# Patient Record
Sex: FEMALE | Race: WHITE | URBAN - METROPOLITAN AREA
[De-identification: names, ages, dates, MRNs, and addresses within clinical notes are randomized per-mention and may not be internally consistent; named-entity substitution may affect disease eponyms.]

---
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
## 2021-01-13 ENCOUNTER — OFFICE (OUTPATIENT)
Dept: URBAN - METROPOLITAN AREA CLINIC 64 | Facility: CLINIC | Age: 21
End: 2021-01-13
Payer: COMMERCIAL

## 2021-01-13 VITALS
SYSTOLIC BLOOD PRESSURE: 112 MMHG | HEIGHT: 60 IN | HEART RATE: 68 BPM | DIASTOLIC BLOOD PRESSURE: 69 MMHG | WEIGHT: 111 LBS

## 2021-01-13 DIAGNOSIS — K58.9 IRRITABLE BOWEL SYNDROME WITHOUT DIARRHEA: ICD-10-CM

## 2021-01-13 DIAGNOSIS — K59.00 CONSTIPATION, UNSPECIFIED: ICD-10-CM

## 2021-01-13 DIAGNOSIS — R10.13 EPIGASTRIC PAIN: ICD-10-CM

## 2021-01-13 PROCEDURE — 99213 OFFICE O/P EST LOW 20 MIN: CPT | Performed by: INTERNAL MEDICINE

## 2021-01-13 RX ORDER — PLECANATIDE 3 MG/1
3 TABLET ORAL
Qty: 90 | Refills: 3 | Status: ACTIVE
Start: 2021-01-13

## 2024-01-15 ENCOUNTER — CLINICAL SUPPORT (OUTPATIENT)
Dept: FAMILY MEDICINE CLINIC | Facility: CLINIC | Age: 24
End: 2024-01-15
Payer: COMMERCIAL

## 2024-01-15 DIAGNOSIS — D50.9 IRON DEFICIENCY ANEMIA, UNSPECIFIED IRON DEFICIENCY ANEMIA TYPE: Primary | ICD-10-CM

## 2024-01-15 DIAGNOSIS — M79.605 LEFT LEG PAIN: Primary | ICD-10-CM

## 2024-01-15 DIAGNOSIS — M79.605 LEFT LEG PAIN: ICD-10-CM

## 2024-01-15 DIAGNOSIS — D50.9 IRON DEFICIENCY ANEMIA, UNSPECIFIED IRON DEFICIENCY ANEMIA TYPE: ICD-10-CM

## 2024-01-15 PROBLEM — M41.9 SCOLIOSIS: Status: ACTIVE | Noted: 2017-03-01

## 2024-01-15 LAB
BASOPHILS # BLD AUTO: 0.06 10*3/MM3 (ref 0–0.2)
BASOPHILS NFR BLD AUTO: 0.7 % (ref 0–1.5)
D DIMER PPP FEU-MCNC: 0.69 MG/L (FEU) (ref 0–0.5)
DEPRECATED RDW RBC AUTO: 39.1 FL (ref 37–54)
EOSINOPHIL # BLD AUTO: 0.25 10*3/MM3 (ref 0–0.4)
EOSINOPHIL NFR BLD AUTO: 2.8 % (ref 0.3–6.2)
ERYTHROCYTE [DISTWIDTH] IN BLOOD BY AUTOMATED COUNT: 12.7 % (ref 12.3–15.4)
HCT VFR BLD AUTO: 35.4 % (ref 34–46.6)
HGB BLD-MCNC: 12.2 G/DL (ref 12–15.9)
IMM GRANULOCYTES # BLD AUTO: 0.01 10*3/MM3 (ref 0–0.05)
IMM GRANULOCYTES NFR BLD AUTO: 0.1 % (ref 0–0.5)
IRON 24H UR-MRATE: 41 MCG/DL (ref 37–145)
IRON SATN MFR SERPL: 10 % (ref 20–50)
LYMPHOCYTES # BLD AUTO: 3.55 10*3/MM3 (ref 0.7–3.1)
LYMPHOCYTES NFR BLD AUTO: 40.3 % (ref 19.6–45.3)
MCH RBC QN AUTO: 29.4 PG (ref 26.6–33)
MCHC RBC AUTO-ENTMCNC: 34.5 G/DL (ref 31.5–35.7)
MCV RBC AUTO: 85.3 FL (ref 79–97)
MONOCYTES # BLD AUTO: 0.73 10*3/MM3 (ref 0.1–0.9)
MONOCYTES NFR BLD AUTO: 8.3 % (ref 5–12)
NEUTROPHILS NFR BLD AUTO: 4.2 10*3/MM3 (ref 1.7–7)
NEUTROPHILS NFR BLD AUTO: 47.8 % (ref 42.7–76)
NRBC BLD AUTO-RTO: 0 /100 WBC (ref 0–0.2)
PLATELET # BLD AUTO: 382 10*3/MM3 (ref 140–450)
PMV BLD AUTO: 9.3 FL (ref 6–12)
RBC # BLD AUTO: 4.15 10*6/MM3 (ref 3.77–5.28)
TIBC SERPL-MCNC: 422 MCG/DL (ref 298–536)
TRANSFERRIN SERPL-MCNC: 283 MG/DL (ref 200–360)
WBC NRBC COR # BLD AUTO: 8.8 10*3/MM3 (ref 3.4–10.8)

## 2024-01-15 PROCEDURE — 36415 COLL VENOUS BLD VENIPUNCTURE: CPT | Performed by: NURSE PRACTITIONER

## 2024-01-15 PROCEDURE — 83540 ASSAY OF IRON: CPT | Performed by: NURSE PRACTITIONER

## 2024-01-15 PROCEDURE — 85025 COMPLETE CBC W/AUTO DIFF WBC: CPT | Performed by: NURSE PRACTITIONER

## 2024-01-15 PROCEDURE — 84466 ASSAY OF TRANSFERRIN: CPT | Performed by: NURSE PRACTITIONER

## 2024-01-15 PROCEDURE — 85379 FIBRIN DEGRADATION QUANT: CPT | Performed by: NURSE PRACTITIONER

## 2024-01-15 NOTE — PROGRESS NOTES
Venipuncture performed in L ARM by RT Eulalia  with good hemostasis. Patient tolerated well. 01/15/24 Mitali Morales, RT

## 2024-01-16 ENCOUNTER — HOSPITAL ENCOUNTER (OUTPATIENT)
Dept: CARDIOLOGY | Facility: HOSPITAL | Age: 24
Discharge: HOME | End: 2024-01-16
Admitting: NURSE PRACTITIONER
Payer: COMMERCIAL

## 2024-01-16 ENCOUNTER — TELEPHONE (OUTPATIENT)
Dept: FAMILY MEDICINE CLINIC | Facility: CLINIC | Age: 24
End: 2024-01-16
Payer: COMMERCIAL

## 2024-01-16 DIAGNOSIS — R79.89 ELEVATED D-DIMER: ICD-10-CM

## 2024-01-16 DIAGNOSIS — M79.605 LEFT LEG PAIN: Primary | ICD-10-CM

## 2024-01-16 DIAGNOSIS — M79.605 LEFT LEG PAIN: ICD-10-CM

## 2024-01-16 LAB
BH CV LOWER VASCULAR LEFT COMMON FEMORAL AUGMENT: NORMAL
BH CV LOWER VASCULAR LEFT COMMON FEMORAL COMPETENT: NORMAL
BH CV LOWER VASCULAR LEFT COMMON FEMORAL COMPRESS: NORMAL
BH CV LOWER VASCULAR LEFT COMMON FEMORAL PHASIC: NORMAL
BH CV LOWER VASCULAR LEFT COMMON FEMORAL SPONT: NORMAL
BH CV LOWER VASCULAR LEFT DISTAL FEMORAL COMPRESS: NORMAL
BH CV LOWER VASCULAR LEFT GASTRONEMIUS COMPRESS: NORMAL
BH CV LOWER VASCULAR LEFT GREATER SAPH AK COMPRESS: NORMAL
BH CV LOWER VASCULAR LEFT LESSER SAPH COMPRESS: NORMAL
BH CV LOWER VASCULAR LEFT MID FEMORAL AUGMENT: NORMAL
BH CV LOWER VASCULAR LEFT MID FEMORAL COMPETENT: NORMAL
BH CV LOWER VASCULAR LEFT MID FEMORAL COMPRESS: NORMAL
BH CV LOWER VASCULAR LEFT MID FEMORAL PHASIC: NORMAL
BH CV LOWER VASCULAR LEFT MID FEMORAL SPONT: NORMAL
BH CV LOWER VASCULAR LEFT PERONEAL COMPRESS: NORMAL
BH CV LOWER VASCULAR LEFT POPLITEAL AUGMENT: NORMAL
BH CV LOWER VASCULAR LEFT POPLITEAL COMPETENT: NORMAL
BH CV LOWER VASCULAR LEFT POPLITEAL COMPRESS: NORMAL
BH CV LOWER VASCULAR LEFT POPLITEAL PHASIC: NORMAL
BH CV LOWER VASCULAR LEFT POPLITEAL SPONT: NORMAL
BH CV LOWER VASCULAR LEFT POSTERIOR TIBIAL COMPRESS: NORMAL
BH CV LOWER VASCULAR LEFT PROXIMAL FEMORAL COMPRESS: NORMAL
BH CV LOWER VASCULAR LEFT SAPHENOFEMORAL JUNCTION COMPRESS: NORMAL
BH CV LOWER VASCULAR RIGHT COMMON FEMORAL AUGMENT: NORMAL
BH CV LOWER VASCULAR RIGHT COMMON FEMORAL COMPETENT: NORMAL
BH CV LOWER VASCULAR RIGHT COMMON FEMORAL COMPRESS: NORMAL
BH CV LOWER VASCULAR RIGHT COMMON FEMORAL PHASIC: NORMAL
BH CV LOWER VASCULAR RIGHT COMMON FEMORAL SPONT: NORMAL
BH CV VAS PRELIMINARY FINDINGS SCRIPTING: 1

## 2024-01-16 PROCEDURE — 93971 EXTREMITY STUDY: CPT

## 2024-01-16 RX ORDER — ETONOGESTREL 68 MG/1
1 IMPLANT SUBCUTANEOUS ONCE
COMMUNITY
Start: 2017-03-01 | End: 2024-01-17

## 2024-01-16 RX ORDER — FERROUS SULFATE 324(65)MG
324 TABLET, DELAYED RELEASE (ENTERIC COATED) ORAL
Qty: 90 TABLET | Refills: 3 | Status: SHIPPED | OUTPATIENT
Start: 2024-01-16

## 2024-01-16 NOTE — TELEPHONE ENCOUNTER
Patient was called on 1/6/2024 and her US of her left leg due to pain was negative.    Electronically signed by NESHA Neil, 01/16/24, 11:12 AM EST.

## 2024-01-17 ENCOUNTER — OFFICE VISIT (OUTPATIENT)
Dept: FAMILY MEDICINE CLINIC | Facility: CLINIC | Age: 24
End: 2024-01-17
Payer: COMMERCIAL

## 2024-01-17 VITALS
RESPIRATION RATE: 16 BRPM | HEIGHT: 62 IN | SYSTOLIC BLOOD PRESSURE: 100 MMHG | OXYGEN SATURATION: 99 % | WEIGHT: 136 LBS | BODY MASS INDEX: 25.03 KG/M2 | HEART RATE: 79 BPM | TEMPERATURE: 98.4 F | DIASTOLIC BLOOD PRESSURE: 64 MMHG

## 2024-01-17 DIAGNOSIS — G43.011 INTRACTABLE MIGRAINE WITHOUT AURA AND WITH STATUS MIGRAINOSUS: ICD-10-CM

## 2024-01-17 DIAGNOSIS — Z98.1 S/P FUSION OF THORACIC SPINE: ICD-10-CM

## 2024-01-17 DIAGNOSIS — Z00.00 ENCOUNTER FOR ANNUAL PHYSICAL EXAM: ICD-10-CM

## 2024-01-17 DIAGNOSIS — D50.9 IRON DEFICIENCY ANEMIA, UNSPECIFIED IRON DEFICIENCY ANEMIA TYPE: ICD-10-CM

## 2024-01-17 DIAGNOSIS — F41.8 DEPRESSION WITH ANXIETY: ICD-10-CM

## 2024-01-17 DIAGNOSIS — Z76.89 ENCOUNTER TO ESTABLISH CARE: ICD-10-CM

## 2024-01-17 DIAGNOSIS — M41.20 IDIOPATHIC SCOLIOSIS AND KYPHOSCOLIOSIS: Primary | ICD-10-CM

## 2024-01-17 DIAGNOSIS — M79.605 LEFT LEG PAIN: ICD-10-CM

## 2024-01-17 PROBLEM — G43.909 MIGRAINES: Status: ACTIVE | Noted: 2024-01-17

## 2024-01-17 PROBLEM — M41.9 SCOLIOSIS: Status: RESOLVED | Noted: 2017-03-01 | Resolved: 2024-01-17

## 2024-01-17 PROBLEM — M51.369 DDD (DEGENERATIVE DISC DISEASE), LUMBAR: Status: ACTIVE | Noted: 2023-02-24

## 2024-01-17 PROBLEM — M51.36 DDD (DEGENERATIVE DISC DISEASE), LUMBAR: Status: ACTIVE | Noted: 2023-02-24

## 2024-01-17 LAB
EXPIRATION DATE: NORMAL
HBA1C MFR BLD: 5.2 % (ref 4.5–5.7)
HCV AB SER DONR QL: NORMAL
Lab: NORMAL

## 2024-01-17 PROCEDURE — 80061 LIPID PANEL: CPT | Performed by: NURSE PRACTITIONER

## 2024-01-17 PROCEDURE — 86803 HEPATITIS C AB TEST: CPT | Performed by: NURSE PRACTITIONER

## 2024-01-17 PROCEDURE — 80053 COMPREHEN METABOLIC PANEL: CPT | Performed by: NURSE PRACTITIONER

## 2024-01-17 RX ORDER — METHYLPREDNISOLONE 4 MG/1
TABLET ORAL
Qty: 21 TABLET | Refills: 0 | Status: SHIPPED | OUTPATIENT
Start: 2024-01-17

## 2024-01-17 RX ORDER — CYCLOBENZAPRINE HCL 10 MG
10 TABLET ORAL 3 TIMES DAILY PRN
Qty: 30 TABLET | Refills: 0 | Status: SHIPPED | OUTPATIENT
Start: 2024-01-17

## 2024-01-17 RX ORDER — MELOXICAM 15 MG/1
15 TABLET ORAL DAILY PRN
Qty: 30 TABLET | Refills: 0 | Status: SHIPPED | OUTPATIENT
Start: 2024-01-17

## 2024-01-17 NOTE — PROGRESS NOTES
Venipuncture performed in right arm by Laurence Ghosh MA with good hemostasis. Patient tolerated well. Laurence Ghosh MA 04/14/23        Fingerstick performed in left middle finger by Laurence Ghosh MA with good hemostasis. Patient tolerated well.

## 2024-01-17 NOTE — ASSESSMENT & PLAN NOTE
Iron defiency anemia-She is not currently on any medications. Has once a month.     On Ubrelvy

## 2024-01-17 NOTE — ASSESSMENT & PLAN NOTE
Left leg pain- Patient recently had left leg pain that started Monday morning. She went to doppler of the left leg due to elevated D-dimer, which was negative. She describes the pain as sharp shooting. Left leg pain in her left upper thigh 8/10 when it shoots the pain and happens every 10 minutes. She reports ibuprofen did not help.     Recent doppler reviewed   Prescribed flexeril, meloxicam, and medrol pack  Encourage exercises     Consider PT or X-ray lumbar spine

## 2024-01-17 NOTE — ASSESSMENT & PLAN NOTE
She used to be on lexapro and caused her to have panic attacks. She has seen a counselor when she was a kid due to her parents being in drugs and CPS was involved.

## 2024-01-17 NOTE — PROGRESS NOTES
Chief Complaint  Chief Complaint   Patient presents with    Establish Care    Leg Pain    Annual physical     Subjective          Casie Beard is here today to establish care and annual physical. The following problems were discussed:     Family history:Mother- melanoma, uterine cancer?, hysterectomy, salphingectomy, epilepsy, HTN. Father- HLD, HTN.  Grandmother- breast cancer.     Social history: Denies smoking, drinks occasionally, denies illegal drug use.     Iron defiency anemia-She is not currently on any medications.     Scoliosis- she had her back surgery February 2023- T4 to L2. Back pain currently 5/10. Describes as burning and achy, sometimes a stabbing. Cold weather makes the pain wrose. Sleeping makes the pain better. She never did physical therapy after surgery. She is not currently on any medication.     Left leg pain- Patient recently had left leg pain that started Monday morning. She went to doppler of the left leg due to elevated D-dimer, which was negative. She describes the pain as sharp shooting. Left leg pain in her left upper thigh 8/10 when it shoots the pain and happens every 10 minutes. She reports ibuprofen did not help.     Migraines- Had MRI previously and diagnosed with menstrual migraines. She reports she takes Ubrelvy and it helps.       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 Moreno's office       Vaccines:  Flu-Received   Covid-19-Received some doses, not receiving anymore     Dental exam- Up to date   Eye exam- Up to date          Review of Systems   Constitutional:  Negative for chills and fever.   HENT:  Negative for congestion.    Eyes: Negative.    Respiratory:  Negative for shortness of breath.    Cardiovascular:  Negative for chest pain.  "  Gastrointestinal:  Negative for abdominal pain, nausea and vomiting.   Endocrine: Negative for polydipsia and polyuria.   Genitourinary:  Negative for difficulty urinating.   Musculoskeletal:  Positive for back pain.        Left leg pain    Skin: Negative.    Neurological:  Positive for headache. Negative for dizziness and light-headedness.   Psychiatric/Behavioral:  Positive for depressed mood. Negative for self-injury and suicidal ideas. The patient is nervous/anxious.         Objective   Vital Signs:   Vitals:    01/17/24 0802   BP: 100/64   Pulse: 79   Resp: 16   Temp: 98.4 °F (36.9 °C)   SpO2: 99%      Estimated body mass index is 24.87 kg/m² as calculated from the following:    Height as of this encounter: 157.5 cm (62\").    Weight as of this encounter: 61.7 kg (136 lb).    BMI is within normal parameters. No other follow-up for BMI required.            Patient screened positive for depression based on a PHQ-9 score of  0 . Follow-up recommendations include: PCP managing depression.        Physical Exam  Vitals reviewed.   Constitutional:       Appearance: Normal appearance. She is normal weight.   HENT:      Head: Normocephalic and atraumatic.      Right Ear: Tympanic membrane normal.      Left Ear: Tympanic membrane normal.      Nose: Nose normal.      Mouth/Throat:      Mouth: Mucous membranes are moist.      Pharynx: Oropharynx is clear.   Eyes:      Extraocular Movements: Extraocular movements intact.      Conjunctiva/sclera: Conjunctivae normal.      Pupils: Pupils are equal, round, and reactive to light.   Cardiovascular:      Rate and Rhythm: Normal rate and regular rhythm.      Pulses: Normal pulses.      Heart sounds: Normal heart sounds.      Comments: S1, S2 audible  Pulmonary:      Effort: Pulmonary effort is normal.      Breath sounds: Normal breath sounds.      Comments: On room air   Abdominal:      General: Abdomen is flat. Bowel sounds are normal.      Palpations: Abdomen is soft. "   Musculoskeletal:         General: Normal range of motion.      Cervical back: Normal range of motion and neck supple.   Skin:     General: Skin is warm and dry.   Neurological:      General: No focal deficit present.      Mental Status: She is alert and oriented to person, place, and time. Mental status is at baseline.   Psychiatric:         Mood and Affect: Mood normal.         Behavior: Behavior normal.         Thought Content: Thought content normal.         Judgment: Judgment normal.                Physical Exam   Result Review :             Procedures       Assessment and Plan     Diagnoses and all orders for this visit:    1. Idiopathic scoliosis and kyphoscoliosis (Primary)  Assessment & Plan:  Scoliosis- she had her back surgery February 2023- T4 to L2. Back pain currently 5/10. Describes as burning and achy, sometimes a stabbing. Cold weather makes the pain wrose. Sleeping makes the pain better. She never did physical therapy after surgery. She is not currently on any medication.  She sees Dr. Sarath Oneal's       2. S/P fusion of thoracic spine    3. Left leg pain  Assessment & Plan:  Left leg pain- Patient recently had left leg pain that started Monday morning. She went to doppler of the left leg due to elevated D-dimer, which was negative. She describes the pain as sharp shooting. Left leg pain in her left upper thigh 8/10 when it shoots the pain and happens every 10 minutes. She reports ibuprofen did not help.     Recent doppler reviewed   Prescribed flexeril, meloxicam, and medrol pack  Encourage exercises     Consider PT or X-ray lumbar spine       4. 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 Encounter for annual physical exam  Assessment &  Plan:  Family history:Mother- melanoma, uterine cancer?, hysterectomy, salphingectomy, epilepsy, HTN. Father- HLD, HTN.  Grandmother- breast cancer twice.     Social history: Denies smoking, drinks occasionally, denies illegal drug use.     Labs-Due   Papsmear-Had 2 years ago at Dr. Moreno's office       Vaccines:  Flu-Received   Covid-19-Received some doses, not receiving anymore     Dental exam- Up to date   Eye exam- Up to date     Encourage healthy diet and exercise  Encourage monthly self breast exams  Check labs     Orders:  -     Comprehensive Metabolic Panel  -     Hemoglobin A1c  -     Lipid Panel  -     Hepatitis C Antibody    6. Iron deficiency anemia, unspecified iron deficiency anemia type  Assessment & Plan:  Iron defiency anemia-She is not currently on any medications.       7. Intractable migraine without aura and with status migrainosus  Assessment & Plan:  Iron defiency anemia-She is not currently on any medications. Has once a month.     On Ubrelvy           8. Depression with anxiety  Assessment & Plan:  She used to be on lexapro and caused her to have panic attacks. She has seen a counselor when she was a kid due to her parents being in drugs and CPS was involved.       Other orders  -     cyclobenzaprine (FLEXERIL) 10 MG tablet; Take 1 tablet by mouth 3 (Three) Times a Day As Needed for Muscle Spasms.  Dispense: 30 tablet; Refill: 0  -     methylPREDNISolone (MEDROL) 4 MG dose pack; Take as directed on package instructions.  Dispense: 21 tablet; Refill: 0  -     meloxicam (Mobic) 15 MG tablet; Take 1 tablet by mouth Daily As Needed for Moderate Pain.  Dispense: 30 tablet; Refill: 0              Follow Up   Return in about 1 year (around 1/17/2025) for Annual physical.   Patient was given instructions and counseling regarding her condition or for health maintenance advice. Please see specific information pulled into the AVS if appropriate.

## 2024-01-17 NOTE — ASSESSMENT & PLAN NOTE
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

## 2024-01-17 NOTE — ASSESSMENT & PLAN NOTE
Scoliosis- she had her back surgery February 2023- T4 to L2. Back pain currently 5/10. Describes as burning and achy, sometimes a stabbing. Cold weather makes the pain wrose. Sleeping makes the pain better. She never did physical therapy after surgery. She is not currently on any medication.  She sees Dr. Sarath Oneal's

## 2024-01-17 NOTE — ASSESSMENT & PLAN NOTE
Family history:Mother- melanoma, uterine cancer?, hysterectomy, salphingectomy, epilepsy, HTN. Father- HLD, HTN.  Grandmother- breast cancer twice.     Social history: Denies smoking, drinks occasionally, denies illegal drug use.     Labs-Due   Papsmear-Had 2 years ago at Dr. Moreno's office       Vaccines:  Flu-Received   Covid-19-Received some doses, not receiving anymore     Dental exam- Up to date   Eye exam- Up to date     Encourage healthy diet and exercise  Encourage monthly self breast exams  Check labs

## 2024-01-17 NOTE — PATIENT INSTRUCTIONS
Prescribed flexeril, meloxicam, and medrol pack  Encourage exercises     Encourage healthy diet and exercise  Encourage monthly self breast exams  Check labs

## 2024-01-18 LAB
ALBUMIN SERPL-MCNC: 4.9 G/DL (ref 3.5–5.2)
ALBUMIN/GLOB SERPL: 1.8 G/DL
ALP SERPL-CCNC: 68 U/L (ref 39–117)
ALT SERPL W P-5'-P-CCNC: 14 U/L (ref 1–33)
ANION GAP SERPL CALCULATED.3IONS-SCNC: 13 MMOL/L (ref 5–15)
AST SERPL-CCNC: 15 U/L (ref 1–32)
BILIRUB SERPL-MCNC: 0.3 MG/DL (ref 0–1.2)
BUN SERPL-MCNC: 16 MG/DL (ref 6–20)
BUN/CREAT SERPL: 22.2 (ref 7–25)
CALCIUM SPEC-SCNC: 9.7 MG/DL (ref 8.6–10.5)
CHLORIDE SERPL-SCNC: 103 MMOL/L (ref 98–107)
CHOLEST SERPL-MCNC: 193 MG/DL (ref 0–200)
CO2 SERPL-SCNC: 23 MMOL/L (ref 22–29)
CREAT SERPL-MCNC: 0.72 MG/DL (ref 0.57–1)
EGFRCR SERPLBLD CKD-EPI 2021: 121.4 ML/MIN/1.73
GLOBULIN UR ELPH-MCNC: 2.7 GM/DL
GLUCOSE SERPL-MCNC: 94 MG/DL (ref 65–99)
HDLC SERPL-MCNC: 44 MG/DL (ref 40–60)
LDLC SERPL CALC-MCNC: 135 MG/DL (ref 0–100)
LDLC/HDLC SERPL: 3.03 {RATIO}
POTASSIUM SERPL-SCNC: 4.4 MMOL/L (ref 3.5–5.2)
PROT SERPL-MCNC: 7.6 G/DL (ref 6–8.5)
SODIUM SERPL-SCNC: 139 MMOL/L (ref 136–145)
TRIGL SERPL-MCNC: 78 MG/DL (ref 0–150)
VLDLC SERPL-MCNC: 14 MG/DL (ref 5–40)

## 2024-03-03 ENCOUNTER — APPOINTMENT (OUTPATIENT)
Dept: CT IMAGING | Facility: HOSPITAL | Age: 24
End: 2024-03-03
Payer: COMMERCIAL

## 2024-03-03 ENCOUNTER — HOSPITAL ENCOUNTER (EMERGENCY)
Facility: HOSPITAL | Age: 24
Discharge: HOME | End: 2024-03-03
Attending: EMERGENCY MEDICINE | Admitting: EMERGENCY MEDICINE
Payer: COMMERCIAL

## 2024-03-03 VITALS
WEIGHT: 136 LBS | TEMPERATURE: 97.3 F | RESPIRATION RATE: 17 BRPM | OXYGEN SATURATION: 99 % | SYSTOLIC BLOOD PRESSURE: 124 MMHG | HEIGHT: 62 IN | HEART RATE: 111 BPM | DIASTOLIC BLOOD PRESSURE: 84 MMHG | BODY MASS INDEX: 25.03 KG/M2

## 2024-03-03 DIAGNOSIS — S39.91XA BLUNT TRAUMA TO ABDOMEN, INITIAL ENCOUNTER: Primary | ICD-10-CM

## 2024-03-03 LAB
ANION GAP SERPL CALCULATED.3IONS-SCNC: 8.6 MMOL/L (ref 5–15)
B-HCG UR QL: NEGATIVE
BASOPHILS # BLD AUTO: 0.05 10*3/MM3 (ref 0–0.2)
BASOPHILS NFR BLD AUTO: 0.5 % (ref 0–1.5)
BILIRUB UR QL STRIP: NEGATIVE
BUN SERPL-MCNC: 14 MG/DL (ref 6–20)
BUN/CREAT SERPL: 24.6 (ref 7–25)
CALCIUM SPEC-SCNC: 9.5 MG/DL (ref 8.6–10.5)
CHLORIDE SERPL-SCNC: 103 MMOL/L (ref 98–107)
CLARITY UR: ABNORMAL
CO2 SERPL-SCNC: 23.4 MMOL/L (ref 22–29)
COLOR UR: YELLOW
CREAT SERPL-MCNC: 0.57 MG/DL (ref 0.57–1)
DEPRECATED RDW RBC AUTO: 41.5 FL (ref 37–54)
EGFRCR SERPLBLD CKD-EPI 2021: 132 ML/MIN/1.73
EOSINOPHIL # BLD AUTO: 0.07 10*3/MM3 (ref 0–0.4)
EOSINOPHIL NFR BLD AUTO: 0.6 % (ref 0.3–6.2)
ERYTHROCYTE [DISTWIDTH] IN BLOOD BY AUTOMATED COUNT: 12.9 % (ref 12.3–15.4)
GLUCOSE SERPL-MCNC: 88 MG/DL (ref 65–99)
GLUCOSE UR STRIP-MCNC: NEGATIVE MG/DL
HCT VFR BLD AUTO: 37.5 % (ref 34–46.6)
HGB BLD-MCNC: 12.2 G/DL (ref 12–15.9)
HGB UR QL STRIP.AUTO: ABNORMAL
IMM GRANULOCYTES # BLD AUTO: 0.02 10*3/MM3 (ref 0–0.05)
IMM GRANULOCYTES NFR BLD AUTO: 0.2 % (ref 0–0.5)
KETONES UR QL STRIP: NEGATIVE
LEUKOCYTE ESTERASE UR QL STRIP.AUTO: NEGATIVE
LYMPHOCYTES # BLD AUTO: 2.35 10*3/MM3 (ref 0.7–3.1)
LYMPHOCYTES NFR BLD AUTO: 21.5 % (ref 19.6–45.3)
MCH RBC QN AUTO: 28.4 PG (ref 26.6–33)
MCHC RBC AUTO-ENTMCNC: 32.5 G/DL (ref 31.5–35.7)
MCV RBC AUTO: 87.2 FL (ref 79–97)
MONOCYTES # BLD AUTO: 0.68 10*3/MM3 (ref 0.1–0.9)
MONOCYTES NFR BLD AUTO: 6.2 % (ref 5–12)
NEUTROPHILS NFR BLD AUTO: 7.77 10*3/MM3 (ref 1.7–7)
NEUTROPHILS NFR BLD AUTO: 71 % (ref 42.7–76)
NITRITE UR QL STRIP: NEGATIVE
PH UR STRIP.AUTO: 7 [PH] (ref 5–8)
PLATELET # BLD AUTO: 323 10*3/MM3 (ref 140–450)
PMV BLD AUTO: 8.4 FL (ref 6–12)
POTASSIUM SERPL-SCNC: 3.8 MMOL/L (ref 3.5–5.2)
PROT UR QL STRIP: NEGATIVE
RBC # BLD AUTO: 4.3 10*6/MM3 (ref 3.77–5.28)
SODIUM SERPL-SCNC: 135 MMOL/L (ref 136–145)
SP GR UR STRIP: 1.02 (ref 1–1.03)
UROBILINOGEN UR QL STRIP: ABNORMAL
WBC NRBC COR # BLD AUTO: 10.94 10*3/MM3 (ref 3.4–10.8)

## 2024-03-03 PROCEDURE — 25510000001 IOPAMIDOL PER 1 ML: Performed by: EMERGENCY MEDICINE

## 2024-03-03 PROCEDURE — 85025 COMPLETE CBC W/AUTO DIFF WBC: CPT | Performed by: EMERGENCY MEDICINE

## 2024-03-03 PROCEDURE — 81025 URINE PREGNANCY TEST: CPT | Performed by: EMERGENCY MEDICINE

## 2024-03-03 PROCEDURE — 99285 EMERGENCY DEPT VISIT HI MDM: CPT

## 2024-03-03 PROCEDURE — 74177 CT ABD & PELVIS W/CONTRAST: CPT

## 2024-03-03 PROCEDURE — 80048 BASIC METABOLIC PNL TOTAL CA: CPT | Performed by: EMERGENCY MEDICINE

## 2024-03-03 PROCEDURE — 99283 EMERGENCY DEPT VISIT LOW MDM: CPT | Performed by: EMERGENCY MEDICINE

## 2024-03-03 PROCEDURE — 81003 URINALYSIS AUTO W/O SCOPE: CPT | Performed by: EMERGENCY MEDICINE

## 2024-03-03 RX ADMIN — IOPAMIDOL 100 ML: 755 INJECTION, SOLUTION INTRAVENOUS at 14:24

## 2024-03-03 NOTE — DISCHARGE INSTRUCTIONS
Rest at home until better.  There is no sign of bleeding or ruptured organs.  Expect bruising to appear on your abdominal wall.  Return for severe pain, very lightheaded, dizziness, loss of consciousness, bloody urine, or any other emergency.  Otherwise, return to normal activities as your pain level allows.

## 2024-03-04 NOTE — FSED PROVIDER NOTE
"Subjective   History of Present Illness  Patient is a 23-year-old female with no manage medical history currently on her menstrual cycle presents to the emergency department after being kicked in her lower abdomen by her horse.  She states that she did not get her \"full strength\".  But she is having some soreness in her abdomen.  She is not having dysuria or hematuria.  No flank pain.  Was not struck in the chest.  No shortness of breath.  No pain in her right upper or left upper quadrants.    History provided by:  Patient      Review of Systems    History reviewed. No pertinent past medical history.    No Known Allergies    Past Surgical History:   Procedure Laterality Date    APPENDECTOMY  2020    BACK SURGERY  2023    T4-L2    DIAGNOSTIC LAPAROSCOPY  2022    WISDOM TOOTH EXTRACTION  2022       History reviewed. No pertinent family history.    Social History     Socioeconomic History    Marital status: Single   Tobacco Use    Smoking status: Never     Passive exposure: Never    Smokeless tobacco: Never   Vaping Use    Vaping status: Former    Start date: 1/1/2020    Quit date: 1/1/2023    Substances: Nicotine    Devices: Disposable   Substance and Sexual Activity    Alcohol use: Yes     Comment: socially    Drug use: Never    Sexual activity: Yes     Partners: Male           Objective   Physical Exam  Vitals and nursing note reviewed.   Constitutional:       Appearance: Normal appearance.   HENT:      Head: Normocephalic and atraumatic.      Mouth/Throat:      Mouth: Mucous membranes are moist.   Eyes:      Pupils: Pupils are equal, round, and reactive to light.   Cardiovascular:      Rate and Rhythm: Normal rate and regular rhythm.      Heart sounds: Normal heart sounds.   Pulmonary:      Breath sounds: Normal breath sounds.   Abdominal:      General: Abdomen is flat.      Tenderness: There is abdominal tenderness (Tenderness to the suprapubic area, seems to be more abdominal wall than deep pain.  Mild ecchymosis " forming).   Skin:     General: Skin is warm and dry.      Capillary Refill: Capillary refill takes less than 2 seconds.   Neurological:      General: No focal deficit present.      Mental Status: She is alert.   Psychiatric:         Mood and Affect: Mood normal.         Procedures           ED Course  ED Course as of 03/03/24 2008   Sun Mar 03, 2024   2007 Hemoglobin: 12.2  Normal [SS]   2007 Basic Metabolic Panel(!)  Unremarkable [SS]   2007 Blood, UA(!): Small (1+)  Menstruating [SS]   2008 No obvious acute findings [SS]   2008 CT Abdomen Pelvis With Contrast  No obvious acute findings on the CT of her abdomen and pelvis.  Hemoglobin normal.  Certainly no sign of viscus perforation or intraperitoneal bleed.  Radiologist commented on some slightly increased fluid from expected in the pelvis but it does not appear to be blood, likely related to her menstrual cycle.  Patient was discharged home to rest with close instructions for return for worsening symptoms or any other emergency. [SS]      ED Course User Index  [SS] Sample, Mauro WASHINGTON MD                                           Medical Decision Making  Problems Addressed:  Blunt trauma to abdomen, initial encounter: complicated acute illness or injury    Amount and/or Complexity of Data Reviewed  Labs: ordered.  Radiology: ordered.    Risk  Prescription drug management.        Final diagnoses:   Blunt trauma to abdomen, initial encounter       ED Disposition  ED Disposition       ED Disposition   Discharge    Condition   Stable    Comment   --               Latoya Harris, APRN  [address]  [place] [id number] [phone]    In 1 week  If you are not entirely well.         Medication List      No changes were made to your prescriptions during this visit.

## 2024-03-06 RX ORDER — TRETINOIN 0.05 G/100G
1 GEL TOPICAL NIGHTLY
Qty: 45 G | Refills: 0 | Status: SHIPPED | OUTPATIENT
Start: 2024-03-06

## 2024-03-22 ENCOUNTER — CLINICAL SUPPORT (OUTPATIENT)
Dept: FAMILY MEDICINE CLINIC | Facility: CLINIC | Age: 24
End: 2024-03-22
Payer: COMMERCIAL

## 2024-03-22 DIAGNOSIS — D50.9 IRON DEFICIENCY ANEMIA, UNSPECIFIED IRON DEFICIENCY ANEMIA TYPE: ICD-10-CM

## 2024-03-22 DIAGNOSIS — D50.9 IRON DEFICIENCY ANEMIA, UNSPECIFIED IRON DEFICIENCY ANEMIA TYPE: Primary | ICD-10-CM

## 2024-03-22 PROCEDURE — 84466 ASSAY OF TRANSFERRIN: CPT | Performed by: NURSE PRACTITIONER

## 2024-03-22 PROCEDURE — 83540 ASSAY OF IRON: CPT | Performed by: NURSE PRACTITIONER

## 2024-03-22 PROCEDURE — 36415 COLL VENOUS BLD VENIPUNCTURE: CPT | Performed by: NURSE PRACTITIONER

## 2024-03-22 NOTE — PROGRESS NOTES
Venipuncture performed in right arm with good hemostasis. Patient tolerated well. Laurence MADDEN MA

## 2024-03-23 LAB
IRON 24H UR-MRATE: 52 MCG/DL (ref 37–145)
IRON SATN MFR SERPL: 12 % (ref 20–50)
TIBC SERPL-MCNC: 431 MCG/DL (ref 298–536)
TRANSFERRIN SERPL-MCNC: 289 MG/DL (ref 200–360)

## 2024-03-25 DIAGNOSIS — D50.9 IRON DEFICIENCY ANEMIA, UNSPECIFIED IRON DEFICIENCY ANEMIA TYPE: Primary | ICD-10-CM

## 2024-04-10 ENCOUNTER — TELEPHONE (OUTPATIENT)
Dept: ONCOLOGY | Facility: CLINIC | Age: 24
End: 2024-04-10

## 2024-04-16 ENCOUNTER — OFFICE VISIT (OUTPATIENT)
Dept: FAMILY MEDICINE CLINIC | Facility: CLINIC | Age: 24
End: 2024-04-16
Payer: COMMERCIAL

## 2024-04-16 VITALS
OXYGEN SATURATION: 99 % | HEIGHT: 62 IN | WEIGHT: 137 LBS | HEART RATE: 79 BPM | BODY MASS INDEX: 25.21 KG/M2 | TEMPERATURE: 98.6 F | DIASTOLIC BLOOD PRESSURE: 77 MMHG | SYSTOLIC BLOOD PRESSURE: 114 MMHG

## 2024-04-16 DIAGNOSIS — R53.83 OTHER FATIGUE: Primary | ICD-10-CM

## 2024-04-16 DIAGNOSIS — Z01.419 WOMEN'S ANNUAL ROUTINE GYNECOLOGICAL EXAMINATION: ICD-10-CM

## 2024-04-16 DIAGNOSIS — R63.5 WEIGHT GAIN: ICD-10-CM

## 2024-04-16 PROBLEM — Z76.89 ENCOUNTER TO ESTABLISH CARE: Status: RESOLVED | Noted: 2024-01-17 | Resolved: 2024-04-16

## 2024-04-16 LAB
ALBUMIN SERPL-MCNC: 4.7 G/DL (ref 3.5–5.2)
ALBUMIN/GLOB SERPL: 1.6 G/DL
ALP SERPL-CCNC: 68 U/L (ref 39–117)
ALT SERPL W P-5'-P-CCNC: 10 U/L (ref 1–33)
ANION GAP SERPL CALCULATED.3IONS-SCNC: 10.7 MMOL/L (ref 5–15)
AST SERPL-CCNC: 18 U/L (ref 1–32)
BASOPHILS # BLD AUTO: 0.04 10*3/MM3 (ref 0–0.2)
BASOPHILS NFR BLD AUTO: 0.7 % (ref 0–1.5)
BILIRUB SERPL-MCNC: 0.5 MG/DL (ref 0–1.2)
BUN SERPL-MCNC: 11 MG/DL (ref 6–20)
BUN/CREAT SERPL: 15.9 (ref 7–25)
CALCIUM SPEC-SCNC: 9.6 MG/DL (ref 8.6–10.5)
CHLORIDE SERPL-SCNC: 104 MMOL/L (ref 98–107)
CO2 SERPL-SCNC: 24.3 MMOL/L (ref 22–29)
CREAT SERPL-MCNC: 0.69 MG/DL (ref 0.57–1)
DEPRECATED RDW RBC AUTO: 38.9 FL (ref 37–54)
EGFRCR SERPLBLD CKD-EPI 2021: 126 ML/MIN/1.73
EOSINOPHIL # BLD AUTO: 0.06 10*3/MM3 (ref 0–0.4)
EOSINOPHIL NFR BLD AUTO: 1 % (ref 0.3–6.2)
ERYTHROCYTE [DISTWIDTH] IN BLOOD BY AUTOMATED COUNT: 12.5 % (ref 12.3–15.4)
FERRITIN SERPL-MCNC: 30.6 NG/ML (ref 13–150)
FSH SERPL-ACNC: 5.45 MIU/ML
GLOBULIN UR ELPH-MCNC: 2.9 GM/DL
GLUCOSE SERPL-MCNC: 90 MG/DL (ref 65–99)
HCT VFR BLD AUTO: 38.8 % (ref 34–46.6)
HGB BLD-MCNC: 12.8 G/DL (ref 12–15.9)
IMM GRANULOCYTES # BLD AUTO: 0.02 10*3/MM3 (ref 0–0.05)
IMM GRANULOCYTES NFR BLD AUTO: 0.3 % (ref 0–0.5)
IRON 24H UR-MRATE: 89 MCG/DL (ref 37–145)
LH SERPL-ACNC: 5.14 MIU/ML
LYMPHOCYTES # BLD AUTO: 1.8 10*3/MM3 (ref 0.7–3.1)
LYMPHOCYTES NFR BLD AUTO: 30.4 % (ref 19.6–45.3)
MCH RBC QN AUTO: 28.6 PG (ref 26.6–33)
MCHC RBC AUTO-ENTMCNC: 33 G/DL (ref 31.5–35.7)
MCV RBC AUTO: 86.8 FL (ref 79–97)
MONOCYTES # BLD AUTO: 0.47 10*3/MM3 (ref 0.1–0.9)
MONOCYTES NFR BLD AUTO: 7.9 % (ref 5–12)
NEUTROPHILS NFR BLD AUTO: 3.54 10*3/MM3 (ref 1.7–7)
NEUTROPHILS NFR BLD AUTO: 59.7 % (ref 42.7–76)
NRBC BLD AUTO-RTO: 0 /100 WBC (ref 0–0.2)
PLATELET # BLD AUTO: 363 10*3/MM3 (ref 140–450)
PMV BLD AUTO: 9.4 FL (ref 6–12)
POTASSIUM SERPL-SCNC: 4.6 MMOL/L (ref 3.5–5.2)
PROLACTIN SERPL-MCNC: 15.9 NG/ML (ref 4.79–23.3)
PROT SERPL-MCNC: 7.6 G/DL (ref 6–8.5)
RBC # BLD AUTO: 4.47 10*6/MM3 (ref 3.77–5.28)
SODIUM SERPL-SCNC: 139 MMOL/L (ref 136–145)
T4 FREE SERPL-MCNC: 1.14 NG/DL (ref 0.93–1.7)
TSH SERPL DL<=0.05 MIU/L-ACNC: 1.75 UIU/ML (ref 0.27–4.2)
VIT B12 BLD-MCNC: 586 PG/ML (ref 211–946)
WBC NRBC COR # BLD AUTO: 5.93 10*3/MM3 (ref 3.4–10.8)

## 2024-04-16 PROCEDURE — 85246 CLOT FACTOR VIII VW ANTIGEN: CPT | Performed by: NURSE PRACTITIONER

## 2024-04-16 PROCEDURE — 84403 ASSAY OF TOTAL TESTOSTERONE: CPT | Performed by: NURSE PRACTITIONER

## 2024-04-16 PROCEDURE — 83002 ASSAY OF GONADOTROPIN (LH): CPT | Performed by: NURSE PRACTITIONER

## 2024-04-16 PROCEDURE — 99214 OFFICE O/P EST MOD 30 MIN: CPT | Performed by: NURSE PRACTITIONER

## 2024-04-16 PROCEDURE — 84402 ASSAY OF FREE TESTOSTERONE: CPT | Performed by: NURSE PRACTITIONER

## 2024-04-16 PROCEDURE — 82728 ASSAY OF FERRITIN: CPT | Performed by: NURSE PRACTITIONER

## 2024-04-16 PROCEDURE — 85240 CLOT FACTOR VIII AHG 1 STAGE: CPT | Performed by: NURSE PRACTITIONER

## 2024-04-16 PROCEDURE — 83540 ASSAY OF IRON: CPT | Performed by: NURSE PRACTITIONER

## 2024-04-16 PROCEDURE — 80050 GENERAL HEALTH PANEL: CPT | Performed by: NURSE PRACTITIONER

## 2024-04-16 PROCEDURE — 83001 ASSAY OF GONADOTROPIN (FSH): CPT | Performed by: NURSE PRACTITIONER

## 2024-04-16 PROCEDURE — 82627 DEHYDROEPIANDROSTERONE: CPT | Performed by: NURSE PRACTITIONER

## 2024-04-16 PROCEDURE — 82607 VITAMIN B-12: CPT | Performed by: NURSE PRACTITIONER

## 2024-04-16 PROCEDURE — 84146 ASSAY OF PROLACTIN: CPT | Performed by: NURSE PRACTITIONER

## 2024-04-16 PROCEDURE — 84439 ASSAY OF FREE THYROXINE: CPT | Performed by: NURSE PRACTITIONER

## 2024-04-16 PROCEDURE — 82652 VIT D 1 25-DIHYDROXY: CPT | Performed by: NURSE PRACTITIONER

## 2024-04-16 PROCEDURE — 85245 CLOT FACTOR VIII VW RISTOCTN: CPT | Performed by: NURSE PRACTITIONER

## 2024-04-16 NOTE — ASSESSMENT & PLAN NOTE
Weight gain- She reports she was on the Nexplanon forever and then got off it and then got another one and eventually got the second one taken out. She reports since she had the second one out she has gained weight. She reports she exercises everyday and eats a healthy diet.  She reports she is fatigued all the time as well.     Check fatigue labs

## 2024-04-16 NOTE — ASSESSMENT & PLAN NOTE
Weight gain- She reports she was on the Nexplanon forever and then got off it and then got another one and eventually got the second one taken out. She reports since she had the second one out she has gained weight. She reports she exercises everyday and eats a healthy diet.  She reports she is fatigued all the time as well.     Check hormone labs

## 2024-04-16 NOTE — PROGRESS NOTES
Venipuncture performed in R ARM by RT Eulalia  with good hemostasis. Patient tolerated well. 04/16/24 Mitali Morales, RT

## 2024-04-16 NOTE — PROGRESS NOTES
"Chief Complaint  Chief Complaint   Patient presents with    DISCUSS HORMONES    Weight Gain     Pt stated concerns with gaining weight since getting her new Nexplanon implanted.         Subjective          Casie Beard is  a 23 year old female who presents to the office today to discuss hormones.     Weight gain- She reports she was on the Nexplanon forever and then got off it and then got another one and eventually got the second one taken out. She reports since she had the second one out she has gained weight. She reports she exercises everyday and eats a healthy diet.  She has been very fatigued as well.              Review of Systems   Constitutional:  Positive for fatigue and unexpected weight gain. Negative for chills and fever.   HENT:  Negative for congestion.    Respiratory:  Negative for shortness of breath.    Cardiovascular:  Negative for chest pain.   Gastrointestinal:  Negative for abdominal pain, nausea and vomiting.   Genitourinary:  Negative for difficulty urinating.   Musculoskeletal:  Negative for myalgias.   Skin: Negative.    Neurological:  Positive for headache. Negative for dizziness and light-headedness.        Objective   Vital Signs:   Vitals:    04/16/24 0921   BP: 114/77   Pulse: 79   Temp: 98.6 °F (37 °C)   SpO2: 99%      Estimated body mass index is 25.05 kg/m² as calculated from the following:    Height as of this encounter: 157.5 cm (62.01\").    Weight as of this encounter: 62.1 kg (137 lb).    BMI is within normal parameters. No other follow-up for BMI required.                    Physical Exam  Vitals reviewed.   Constitutional:       Appearance: Normal appearance. She is normal weight.   HENT:      Head: Normocephalic and atraumatic.      Nose: Nose normal.      Mouth/Throat:      Mouth: Mucous membranes are moist.      Pharynx: Oropharynx is clear.   Eyes:      Extraocular Movements: Extraocular movements intact.      Conjunctiva/sclera: Conjunctivae normal.   Cardiovascular: "      Rate and Rhythm: Normal rate and regular rhythm.      Pulses: Normal pulses.      Heart sounds: Normal heart sounds.      Comments: S1, S2 audible  Pulmonary:      Effort: Pulmonary effort is normal.      Breath sounds: Normal breath sounds.      Comments: On room air   Abdominal:      General: Abdomen is flat.   Musculoskeletal:         General: Normal range of motion.      Cervical back: Normal range of motion.   Skin:     General: Skin is warm and dry.   Neurological:      General: No focal deficit present.      Mental Status: She is alert and oriented to person, place, and time. Mental status is at baseline.   Psychiatric:         Mood and Affect: Mood normal.         Behavior: Behavior normal.         Thought Content: Thought content normal.         Judgment: Judgment normal.                Physical Exam   Result Review :             Procedures       Assessment and Plan     Diagnoses and all orders for this visit:    1. Other fatigue (Primary)  Assessment & Plan:  Weight gain- She reports she was on the Nexplanon forever and then got off it and then got another one and eventually got the second one taken out. She reports since she had the second one out she has gained weight. She reports she exercises everyday and eats a healthy diet.  She reports she is fatigued all the time as well.     Check fatigue labs     Orders:  -     CBC and Differential  -     Comprehensive metabolic panel  -     Iron  -     Testosterone, free, total  -     Vitamin B12  -     Vitamin D 1,25 dihydroxy  -     TSH+Free T4    2. Weight gain  Assessment & Plan:  Weight gain- She reports she was on the Nexplanon forever and then got off it and then got another one and eventually got the second one taken out. She reports since she had the second one out she has gained weight. She reports she exercises everyday and eats a healthy diet.  She reports she is fatigued all the time as well.     Check hormone labs     Orders:  -      DHEA-sulfate  -     Ferritin  -     Follicle stimulating hormone  -     Luteinizing hormone  -     Prolactin  -     Von Willebrand panel    3. Women's annual routine gynecological examination  -     Ambulatory Referral to Obstetrics / Gynecology              Follow Up   Return if symptoms worsen or fail to improve.   Patient was given instructions and counseling regarding her condition or for health maintenance advice. Please see specific information pulled into the AVS if appropriate.

## 2024-04-18 LAB — DHEA-S SERPL-MCNC: 309 UG/DL (ref 110–431.7)

## 2024-04-19 ENCOUNTER — PATIENT ROUNDING (BHMG ONLY) (OUTPATIENT)
Dept: FAMILY MEDICINE CLINIC | Facility: CLINIC | Age: 24
End: 2024-04-19
Payer: COMMERCIAL

## 2024-04-19 LAB
FACT VIII ACT/NOR PPP: 53 % (ref 56–140)
PATH INTERP BLD-IMP: NORMAL
VWF AG ACT/NOR PPP IA: 80 % (ref 50–200)
VWF:RCO ACT/NOR PPP PL AGG: 60 % (ref 50–200)

## 2024-04-21 LAB
TESTOST FREE SERPL-MCNC: 0.5 PG/ML (ref 0–4.2)
TESTOST SERPL-MCNC: 28 NG/DL (ref 13–71)

## 2024-04-22 LAB — 1,25(OH)2D SERPL-MCNC: 52.8 PG/ML (ref 24.8–81.5)

## 2024-05-09 ENCOUNTER — CONSULT (OUTPATIENT)
Dept: ONCOLOGY | Facility: CLINIC | Age: 24
End: 2024-05-09
Payer: COMMERCIAL

## 2024-05-09 ENCOUNTER — LAB (OUTPATIENT)
Dept: LAB | Facility: HOSPITAL | Age: 24
End: 2024-05-09
Payer: COMMERCIAL

## 2024-05-09 VITALS
BODY MASS INDEX: 26.31 KG/M2 | OXYGEN SATURATION: 99 % | TEMPERATURE: 98 F | HEIGHT: 60 IN | RESPIRATION RATE: 18 BRPM | HEART RATE: 74 BPM | DIASTOLIC BLOOD PRESSURE: 74 MMHG | WEIGHT: 134 LBS | SYSTOLIC BLOOD PRESSURE: 107 MMHG

## 2024-05-09 DIAGNOSIS — D50.9 IRON DEFICIENCY ANEMIA, UNSPECIFIED IRON DEFICIENCY ANEMIA TYPE: ICD-10-CM

## 2024-05-09 DIAGNOSIS — D50.9 IRON DEFICIENCY ANEMIA, UNSPECIFIED IRON DEFICIENCY ANEMIA TYPE: Primary | ICD-10-CM

## 2024-05-09 LAB
BACTERIA UR QL AUTO: ABNORMAL /HPF
BASOPHILS # BLD AUTO: 0.03 10*3/MM3 (ref 0–0.2)
BASOPHILS NFR BLD AUTO: 0.4 % (ref 0–1.5)
BILIRUB UR QL STRIP: NEGATIVE
CLARITY UR: CLEAR
COLOR UR: ABNORMAL
DEPRECATED RDW RBC AUTO: 39.6 FL (ref 37–54)
EOSINOPHIL # BLD AUTO: 0.13 10*3/MM3 (ref 0–0.4)
EOSINOPHIL NFR BLD AUTO: 1.9 % (ref 0.3–6.2)
ERYTHROCYTE [DISTWIDTH] IN BLOOD BY AUTOMATED COUNT: 12.6 % (ref 12.3–15.4)
GLUCOSE UR STRIP-MCNC: NEGATIVE MG/DL
HCT VFR BLD AUTO: 39.2 % (ref 34–46.6)
HGB BLD-MCNC: 13.2 G/DL (ref 12–15.9)
HGB UR QL STRIP.AUTO: ABNORMAL
HYALINE CASTS UR QL AUTO: ABNORMAL /LPF
KETONES UR QL STRIP: NEGATIVE
LEUKOCYTE ESTERASE UR QL STRIP.AUTO: ABNORMAL
LYMPHOCYTES # BLD AUTO: 2.05 10*3/MM3 (ref 0.7–3.1)
LYMPHOCYTES NFR BLD AUTO: 29.6 % (ref 19.6–45.3)
MCH RBC QN AUTO: 29.6 PG (ref 26.6–33)
MCHC RBC AUTO-ENTMCNC: 33.7 G/DL (ref 31.5–35.7)
MCV RBC AUTO: 87.9 FL (ref 79–97)
MONOCYTES # BLD AUTO: 0.44 10*3/MM3 (ref 0.1–0.9)
MONOCYTES NFR BLD AUTO: 6.4 % (ref 5–12)
NEUTROPHILS NFR BLD AUTO: 4.27 10*3/MM3 (ref 1.7–7)
NEUTROPHILS NFR BLD AUTO: 61.7 % (ref 42.7–76)
NITRITE UR QL STRIP: NEGATIVE
PH UR STRIP.AUTO: 7 [PH] (ref 5–8)
PLATELET # BLD AUTO: 152 10*3/MM3 (ref 140–450)
PMV BLD AUTO: 10.4 FL (ref 6–12)
PROT UR QL STRIP: NEGATIVE
RBC # BLD AUTO: 4.46 10*6/MM3 (ref 3.77–5.28)
RBC # UR STRIP: ABNORMAL /HPF
REF LAB TEST METHOD: ABNORMAL
SP GR UR STRIP: 1.01 (ref 1–1.03)
SQUAMOUS #/AREA URNS HPF: ABNORMAL /HPF
UROBILINOGEN UR QL STRIP: ABNORMAL
WBC # UR STRIP: ABNORMAL /HPF
WBC NRBC COR # BLD AUTO: 6.92 10*3/MM3 (ref 3.4–10.8)

## 2024-05-09 PROCEDURE — 85025 COMPLETE CBC W/AUTO DIFF WBC: CPT

## 2024-05-09 PROCEDURE — 87086 URINE CULTURE/COLONY COUNT: CPT | Performed by: INTERNAL MEDICINE

## 2024-05-09 PROCEDURE — 81001 URINALYSIS AUTO W/SCOPE: CPT | Performed by: INTERNAL MEDICINE

## 2024-05-09 PROCEDURE — 36415 COLL VENOUS BLD VENIPUNCTURE: CPT

## 2024-05-10 LAB — BACTERIA SPEC AEROBE CULT: NORMAL

## 2024-06-05 ENCOUNTER — OFFICE VISIT (OUTPATIENT)
Dept: FAMILY MEDICINE CLINIC | Facility: CLINIC | Age: 24
End: 2024-06-05
Payer: COMMERCIAL

## 2024-06-05 VITALS
TEMPERATURE: 98.4 F | HEIGHT: 60 IN | OXYGEN SATURATION: 98 % | DIASTOLIC BLOOD PRESSURE: 71 MMHG | BODY MASS INDEX: 26.31 KG/M2 | HEART RATE: 79 BPM | SYSTOLIC BLOOD PRESSURE: 112 MMHG | WEIGHT: 134 LBS

## 2024-06-05 DIAGNOSIS — M54.9 UPPER BACK PAIN: Primary | ICD-10-CM

## 2024-06-05 PROCEDURE — 99213 OFFICE O/P EST LOW 20 MIN: CPT | Performed by: NURSE PRACTITIONER

## 2024-06-05 RX ORDER — METHYLPREDNISOLONE 4 MG/1
TABLET ORAL
Qty: 21 TABLET | Refills: 0 | Status: SHIPPED | OUTPATIENT
Start: 2024-06-05

## 2024-06-05 RX ORDER — CYCLOBENZAPRINE HCL 10 MG
10 TABLET ORAL 3 TIMES DAILY PRN
Qty: 30 TABLET | Refills: 0 | Status: SHIPPED | OUTPATIENT
Start: 2024-06-05

## 2024-06-05 RX ORDER — MELOXICAM 15 MG/1
15 TABLET ORAL DAILY PRN
Qty: 30 TABLET | Refills: 0 | Status: SHIPPED | OUTPATIENT
Start: 2024-06-05

## 2024-06-05 NOTE — ASSESSMENT & PLAN NOTE
Upper back pain- She reports she stood up in the bathroom and started to develop a sharp pain between her shoulder blades. She reports she felt a pop. Has history of scoliosis. Upper back pain currently 5/10. Describes the pain as sharp. Relaxing makes the pain worse, movement makes the pain better. She feels like her skin is ripping apart. She took 3 ibuprofen and it has helped some. She has never had this pain before.      X-ray thoracic spine ordered   Prescribed medrol dose pack, re-fill meloxicam, and flexeril   Consider PT if needed

## 2024-06-05 NOTE — PROGRESS NOTES
"Chief Complaint  Chief Complaint   Patient presents with    Back Pain        Subjective          Casie Beard is a 23 year old female who presents to the office today due to back pain.     Upper back pain- She reports she stood up in the bathroom and started to develop a sharp pain between her shoulder blades. She reports she felt a pop. Has history of scoliosis. Upper back pain currently 5/10. Describes the pain as sharp. Relaxing makes the pain worse, movement makes the pain better. She feels like her skin is ripping apart. She took 3 ibuprofen and it has helped some. She has never had this pain before.           Review of Systems   Constitutional:  Negative for chills and fever.   HENT:  Negative for congestion.    Respiratory:  Negative for shortness of breath.    Cardiovascular:  Negative for chest pain.   Gastrointestinal:  Negative for abdominal pain, nausea and vomiting.   Genitourinary:  Negative for difficulty urinating.   Musculoskeletal:  Positive for back pain.   Skin: Negative.    Neurological:  Negative for dizziness, light-headedness and headache.        Objective   Vital Signs:   Vitals:    06/05/24 1114   BP: 112/71   Pulse: 79   Temp: 98.4 °F (36.9 °C)   SpO2: 98%      Estimated body mass index is 26.17 kg/m² as calculated from the following:    Height as of this encounter: 152.4 cm (60\").    Weight as of this encounter: 60.8 kg (134 lb).                          Physical Exam  Vitals reviewed.   Constitutional:       Appearance: Normal appearance. She is normal weight.   HENT:      Head: Normocephalic and atraumatic.      Nose: Nose normal.      Mouth/Throat:      Mouth: Mucous membranes are moist.      Pharynx: Oropharynx is clear.   Eyes:      Extraocular Movements: Extraocular movements intact.      Conjunctiva/sclera: Conjunctivae normal.   Cardiovascular:      Rate and Rhythm: Normal rate and regular rhythm.      Pulses: Normal pulses.      Heart sounds: Normal heart sounds.      " Comments: S1, S2 audible  Pulmonary:      Effort: Pulmonary effort is normal.      Breath sounds: Normal breath sounds.      Comments: On room air   Abdominal:      General: Abdomen is flat.   Musculoskeletal:         General: Normal range of motion.      Cervical back: Normal range of motion.   Skin:     General: Skin is warm and dry.   Neurological:      General: No focal deficit present.      Mental Status: She is alert and oriented to person, place, and time. Mental status is at baseline.   Psychiatric:         Mood and Affect: Mood normal.         Behavior: Behavior normal.         Thought Content: Thought content normal.         Judgment: Judgment normal.                Physical Exam   Result Review :             Procedures       Assessment and Plan     Diagnoses and all orders for this visit:    1. Upper back pain (Primary)  Assessment & Plan:  Upper back pain- She reports she stood up in the bathroom and started to develop a sharp pain between her shoulder blades. She reports she felt a pop. Has history of scoliosis. Upper back pain currently 5/10. Describes the pain as sharp. Relaxing makes the pain worse, movement makes the pain better. She feels like her skin is ripping apart. She took 3 ibuprofen and it has helped some. She has never had this pain before.      X-ray thoracic spine ordered   Prescribed medrol dose pack, re-fill meloxicam, and flexeril   Consider PT if needed     Orders:  -     XR Spine Thoracic 3 View (In Office)    Other orders  -     meloxicam (Mobic) 15 MG tablet; Take 1 tablet by mouth Daily As Needed for Moderate Pain.  Dispense: 30 tablet; Refill: 0  -     methylPREDNISolone (MEDROL) 4 MG dose pack; Take as directed on package instructions.  Dispense: 21 tablet; Refill: 0  -     cyclobenzaprine (FLEXERIL) 10 MG tablet; Take 1 tablet by mouth 3 (Three) Times a Day As Needed for Muscle Spasms.  Dispense: 30 tablet; Refill: 0              Follow Up   Return if symptoms worsen or fail  to improve.   Patient was given instructions and counseling regarding her condition or for health maintenance advice. Please see specific information pulled into the AVS if appropriate.

## 2024-06-18 DIAGNOSIS — R10.9 ABDOMINAL PAIN, UNSPECIFIED ABDOMINAL LOCATION: Primary | ICD-10-CM

## 2024-06-19 ENCOUNTER — CLINICAL SUPPORT (OUTPATIENT)
Dept: FAMILY MEDICINE CLINIC | Facility: CLINIC | Age: 24
End: 2024-06-19
Payer: COMMERCIAL

## 2024-06-19 DIAGNOSIS — R10.9 ABDOMINAL PAIN, UNSPECIFIED ABDOMINAL LOCATION: ICD-10-CM

## 2024-06-19 PROCEDURE — 36415 COLL VENOUS BLD VENIPUNCTURE: CPT | Performed by: NURSE PRACTITIONER

## 2024-06-19 PROCEDURE — 86364 TISS TRNSGLTMNASE EA IG CLAS: CPT | Performed by: NURSE PRACTITIONER

## 2024-06-19 PROCEDURE — 86258 DGP ANTIBODY EACH IG CLASS: CPT | Performed by: NURSE PRACTITIONER

## 2024-06-19 PROCEDURE — 82784 ASSAY IGA/IGD/IGG/IGM EACH: CPT | Performed by: NURSE PRACTITIONER

## 2024-06-19 NOTE — PROGRESS NOTES
Venipuncture performed in L ARM by RT Eulalia  with good hemostasis. Patient tolerated well. 06/19/24 Mitali Morales, RT

## 2024-06-21 LAB
GLIADIN PEPTIDE IGA SER-ACNC: 9 UNITS (ref 0–19)
IGA SERPL-MCNC: 349 MG/DL (ref 87–352)
TTG IGA SER-ACNC: <2 U/ML (ref 0–3)

## 2024-08-06 DIAGNOSIS — D50.9 IRON DEFICIENCY ANEMIA, UNSPECIFIED IRON DEFICIENCY ANEMIA TYPE: Primary | ICD-10-CM

## 2024-08-07 ENCOUNTER — CLINICAL SUPPORT (OUTPATIENT)
Dept: FAMILY MEDICINE CLINIC | Facility: CLINIC | Age: 24
End: 2024-08-07
Payer: COMMERCIAL

## 2024-08-07 DIAGNOSIS — D50.9 IRON DEFICIENCY ANEMIA, UNSPECIFIED IRON DEFICIENCY ANEMIA TYPE: ICD-10-CM

## 2024-08-07 PROCEDURE — 84466 ASSAY OF TRANSFERRIN: CPT | Performed by: NURSE PRACTITIONER

## 2024-08-07 PROCEDURE — 83540 ASSAY OF IRON: CPT | Performed by: NURSE PRACTITIONER

## 2024-08-07 PROCEDURE — 85025 COMPLETE CBC W/AUTO DIFF WBC: CPT | Performed by: NURSE PRACTITIONER

## 2024-08-07 PROCEDURE — 36415 COLL VENOUS BLD VENIPUNCTURE: CPT | Performed by: NURSE PRACTITIONER

## 2024-08-07 NOTE — PROGRESS NOTES
Venipuncture performed in L ARM by RT Eulalia  with good hemostasis. Patient tolerated well. 08/07/24 Mitali Morales, RT

## 2024-08-08 LAB
BASOPHILS # BLD AUTO: 0.04 10*3/MM3 (ref 0–0.2)
BASOPHILS NFR BLD AUTO: 0.6 % (ref 0–1.5)
DEPRECATED RDW RBC AUTO: 39.3 FL (ref 37–54)
EOSINOPHIL # BLD AUTO: 0.09 10*3/MM3 (ref 0–0.4)
EOSINOPHIL NFR BLD AUTO: 1.4 % (ref 0.3–6.2)
ERYTHROCYTE [DISTWIDTH] IN BLOOD BY AUTOMATED COUNT: 12.3 % (ref 12.3–15.4)
HCT VFR BLD AUTO: 37.3 % (ref 34–46.6)
HGB BLD-MCNC: 12.2 G/DL (ref 12–15.9)
IMM GRANULOCYTES # BLD AUTO: 0.02 10*3/MM3 (ref 0–0.05)
IMM GRANULOCYTES NFR BLD AUTO: 0.3 % (ref 0–0.5)
IRON 24H UR-MRATE: 90 MCG/DL (ref 37–145)
IRON SATN MFR SERPL: 21 % (ref 20–50)
LYMPHOCYTES # BLD AUTO: 2.36 10*3/MM3 (ref 0.7–3.1)
LYMPHOCYTES NFR BLD AUTO: 36.2 % (ref 19.6–45.3)
MCH RBC QN AUTO: 28.8 PG (ref 26.6–33)
MCHC RBC AUTO-ENTMCNC: 32.7 G/DL (ref 31.5–35.7)
MCV RBC AUTO: 88.2 FL (ref 79–97)
MONOCYTES # BLD AUTO: 0.58 10*3/MM3 (ref 0.1–0.9)
MONOCYTES NFR BLD AUTO: 8.9 % (ref 5–12)
NEUTROPHILS NFR BLD AUTO: 3.43 10*3/MM3 (ref 1.7–7)
NEUTROPHILS NFR BLD AUTO: 52.6 % (ref 42.7–76)
NRBC BLD AUTO-RTO: 0 /100 WBC (ref 0–0.2)
PLATELET # BLD AUTO: 351 10*3/MM3 (ref 140–450)
PMV BLD AUTO: 9.3 FL (ref 6–12)
RBC # BLD AUTO: 4.23 10*6/MM3 (ref 3.77–5.28)
TIBC SERPL-MCNC: 425 MCG/DL (ref 298–536)
TRANSFERRIN SERPL-MCNC: 285 MG/DL (ref 200–360)
WBC NRBC COR # BLD AUTO: 6.52 10*3/MM3 (ref 3.4–10.8)

## 2024-09-23 RX ORDER — DESOGESTREL AND ETHINYL ESTRADIOL 0.15-0.03
1 KIT ORAL DAILY
Qty: 28 TABLET | Refills: 12 | Status: SHIPPED | OUTPATIENT
Start: 2024-09-23 | End: 2025-09-23

## 2024-10-01 ENCOUNTER — FLU SHOT (OUTPATIENT)
Dept: FAMILY MEDICINE CLINIC | Facility: CLINIC | Age: 24
End: 2024-10-01
Payer: COMMERCIAL

## 2024-10-01 DIAGNOSIS — Z23 NEED FOR INFLUENZA VACCINATION: Primary | ICD-10-CM

## 2024-10-01 PROCEDURE — 90471 IMMUNIZATION ADMIN: CPT | Performed by: NURSE PRACTITIONER

## 2024-10-01 PROCEDURE — 90656 IIV3 VACC NO PRSV 0.5 ML IM: CPT | Performed by: NURSE PRACTITIONER

## 2024-10-04 ENCOUNTER — PATIENT ROUNDING (BHMG ONLY) (OUTPATIENT)
Dept: FAMILY MEDICINE CLINIC | Facility: CLINIC | Age: 24
End: 2024-10-04
Payer: COMMERCIAL

## 2024-10-18 ENCOUNTER — OFFICE VISIT (OUTPATIENT)
Dept: FAMILY MEDICINE CLINIC | Facility: CLINIC | Age: 24
End: 2024-10-18
Payer: COMMERCIAL

## 2024-10-18 ENCOUNTER — TRANSCRIBE ORDERS (OUTPATIENT)
Dept: PHYSICAL THERAPY | Facility: CLINIC | Age: 24
End: 2024-10-18
Payer: COMMERCIAL

## 2024-10-18 VITALS
WEIGHT: 135 LBS | HEART RATE: 98 BPM | DIASTOLIC BLOOD PRESSURE: 67 MMHG | OXYGEN SATURATION: 98 % | HEIGHT: 60 IN | RESPIRATION RATE: 14 BRPM | TEMPERATURE: 98.4 F | SYSTOLIC BLOOD PRESSURE: 119 MMHG | BODY MASS INDEX: 26.5 KG/M2

## 2024-10-18 DIAGNOSIS — M54.50 CHRONIC LOW BACK PAIN, UNSPECIFIED BACK PAIN LATERALITY, UNSPECIFIED WHETHER SCIATICA PRESENT: ICD-10-CM

## 2024-10-18 DIAGNOSIS — M41.20 IDIOPATHIC SCOLIOSIS AND KYPHOSCOLIOSIS: Primary | ICD-10-CM

## 2024-10-18 DIAGNOSIS — G89.29 CHRONIC LOW BACK PAIN, UNSPECIFIED BACK PAIN LATERALITY, UNSPECIFIED WHETHER SCIATICA PRESENT: ICD-10-CM

## 2024-10-18 PROCEDURE — 99213 OFFICE O/P EST LOW 20 MIN: CPT | Performed by: NURSE PRACTITIONER

## 2024-10-18 RX ORDER — DESOGESTREL AND ETHINYL ESTRADIOL 0.15-0.03
1 KIT ORAL DAILY
Qty: 28 TABLET | Refills: 12 | Status: SHIPPED | OUTPATIENT
Start: 2024-10-18 | End: 2025-10-18

## 2024-10-18 RX ORDER — MELOXICAM 15 MG/1
15 TABLET ORAL DAILY PRN
Qty: 30 TABLET | Refills: 3 | Status: SHIPPED | OUTPATIENT
Start: 2024-10-18

## 2024-10-18 NOTE — ASSESSMENT & PLAN NOTE
Back pain- She last had back surgery in Feb 2023. She denies any recent heavy lifting or injuries. She reports she always has pain in her back, but the past couple of months it has progressively gotten worse. Her current pain is 5/10. Describes the pain as sharp sometimes, but normally achy and throbbing. She reports sometimes she will breathes wrong and it hurts. She reports standing hurts as well. She reports some shortness of breath because it can hurt to breathe. She has taken ibuprofen to try to help. She reports her pain is between her shoulder blades and the lower middle back.     Referral to Physical therapy   Re-filled meloxicam   Encourage to follow-up with Spine surgery as needed

## 2024-10-18 NOTE — PROGRESS NOTES
"Chief Complaint  Chief Complaint   Patient presents with    Back Pain        Subjective          Casie Beard is a 23-year-old female who reports to the office today due to back pain.    Back pain- She last had back surgery in Feb 2023. She denies any recent heavy lifting or injuries. She reports she always has pain in her back, but the past couple of months it has progressively gotten worse. Her current pain is 5/10. Describes the pain as sharp sometimes, but normally achy and throbbing. She reports sometimes she will breathes wrong and it hurts. She reports standing hurts as well. She reports some shortness of breath because it can hurt to breathe. She has taken ibuprofen to try to help. She reports her pain is between her shoulder blades and the lower middle back.          Review of Systems   Constitutional:  Negative for chills and fever.   HENT:  Negative for congestion.    Respiratory:  Negative for shortness of breath.    Cardiovascular:  Negative for chest pain.   Gastrointestinal:  Negative for abdominal pain, nausea and vomiting.   Genitourinary:  Negative for difficulty urinating.   Musculoskeletal:  Positive for back pain. Negative for myalgias and neck pain.   Skin: Negative.    Neurological:  Negative for dizziness, light-headedness and headache.        Objective   Vital Signs:   Vitals:    10/18/24 1233   BP: 119/67   Pulse: 98   Resp: 14   Temp: 98.4 °F (36.9 °C)   SpO2: 98%      Estimated body mass index is 26.37 kg/m² as calculated from the following:    Height as of this encounter: 152.4 cm (60\").    Weight as of this encounter: 61.2 kg (135 lb).    BMI is >= 25 and <30. (Overweight) The following options were offered after discussion;: exercise counseling/recommendations and nutrition counseling/recommendations                    Physical Exam  Vitals reviewed.   Constitutional:       Appearance: Normal appearance. She is normal weight.   HENT:      Head: Normocephalic and atraumatic.      " Nose: Nose normal.      Mouth/Throat:      Mouth: Mucous membranes are moist.      Pharynx: Oropharynx is clear.   Eyes:      Extraocular Movements: Extraocular movements intact.      Conjunctiva/sclera: Conjunctivae normal.   Cardiovascular:      Rate and Rhythm: Normal rate and regular rhythm.      Pulses: Normal pulses.      Heart sounds: Normal heart sounds.      Comments: S1, S2 audible  Pulmonary:      Effort: Pulmonary effort is normal.      Breath sounds: Normal breath sounds.      Comments: On room air   Abdominal:      General: Abdomen is flat.   Musculoskeletal:         General: Normal range of motion.      Cervical back: Normal range of motion.   Skin:     General: Skin is warm and dry.   Neurological:      General: No focal deficit present.      Mental Status: She is alert and oriented to person, place, and time. Mental status is at baseline.   Psychiatric:         Mood and Affect: Mood normal.         Behavior: Behavior normal.         Thought Content: Thought content normal.         Judgment: Judgment normal.                Physical Exam   Result Review :             Procedures       Assessment and Plan     Diagnoses and all orders for this visit:    1. Idiopathic scoliosis and kyphoscoliosis (Primary)  Assessment & Plan:  Back pain- She last had back surgery in Feb 2023. She denies any recent heavy lifting or injuries. She reports she always has pain in her back, but the past couple of months it has progressively gotten worse. Her current pain is 5/10. Describes the pain as sharp sometimes, but normally achy and throbbing. She reports sometimes she will breathes wrong and it hurts. She reports standing hurts as well. She reports some shortness of breath because it can hurt to breathe. She has taken ibuprofen to try to help. She reports her pain is between her shoulder blades and the lower middle back.     Referral to Physical therapy   Re-filled meloxicam   Encourage to follow-up with Spine surgery  as needed     Orders:  -     Ambulatory Referral to Physical Therapy for Evaluation & Treatment    2. Chronic low back pain, unspecified back pain laterality, unspecified whether sciatica present  -     Ambulatory Referral to Physical Therapy for Evaluation & Treatment    Other orders  -     meloxicam (Mobic) 15 MG tablet; Take 1 tablet by mouth Daily As Needed for Moderate Pain.  Dispense: 30 tablet; Refill: 3  -     desogestrel-ethinyl estradiol (Apri) 0.15-30 MG-MCG per tablet; Take 1 tablet by mouth Daily.  Dispense: 28 tablet; Refill: 12              Follow Up   Return for Next scheduled follow up.   Patient was given instructions and counseling regarding her condition or for health maintenance advice. Please see specific information pulled into the AVS if appropriate.

## 2024-10-18 NOTE — PATIENT INSTRUCTIONS
Referral to Physical therapy   Re-filled meloxicam   Encourage to follow-up with Spine surgery as needed

## 2024-10-23 ENCOUNTER — TREATMENT (OUTPATIENT)
Dept: PHYSICAL THERAPY | Facility: CLINIC | Age: 24
End: 2024-10-23
Payer: COMMERCIAL

## 2024-10-23 DIAGNOSIS — M54.50 CHRONIC LOW BACK PAIN, UNSPECIFIED BACK PAIN LATERALITY, UNSPECIFIED WHETHER SCIATICA PRESENT: ICD-10-CM

## 2024-10-23 DIAGNOSIS — G89.29 CHRONIC LOW BACK PAIN, UNSPECIFIED BACK PAIN LATERALITY, UNSPECIFIED WHETHER SCIATICA PRESENT: ICD-10-CM

## 2024-10-23 DIAGNOSIS — M41.20 IDIOPATHIC SCOLIOSIS AND KYPHOSCOLIOSIS: Primary | ICD-10-CM

## 2024-10-23 NOTE — PROGRESS NOTES
Physical Therapy Initial Evaluation and Plan of Care    Patient: Casie Beard   : 2000  Diagnosis/ICD-10 Code:  Idiopathic scoliosis and kyphoscoliosis [M41.20]  Referring practitioner: NESHA Neil  Date of Initial Visit: 10/23/2024  Today's Date: 10/23/2024  Patient seen for 1 sessions  PT Clinic location:  [place] [address]    Phone: [phone]    Fax: [phone]         Subjective Questionnaire: Oswestry: 44%    Subjective Evaluation    History of Present Illness  Mechanism of injury: History of current condition: Presents with reports of worsening mid and lower back pain. Had a T4-L2 fusion in 2023. Did well after the surgery, but thinks she is weak and that's why she's having pain. Mid back hurts really bad when she sneezes. Does get occasional N/T in her legs, but only when she moves a certain way and only lasts a few seconds.    Makes symptoms worse: sitting a lot, sneezing, bending forward, higher activity level.    Makes symptoms better: rest, lying down, getting up and walking.     Reported functional limitation: can't sit very long, can't exercises, difficulty sleeping (wakes her up multiple times per night), can't run           Patient Occupation: MA at [employer] Quality of life: excellent    Pain  Current pain ratin  At best pain ratin  At worst pain ratin    Patient Goals  Patient goals for therapy: decreased pain, improved balance, increased strength, independence with ADLs/IADLs and return to sport/leisure activities         Medical history: spinal fusion T4-L2, anxiety, arthritis, depression, headaches. See chart for further detail.   Therapy Precautions: Hx T4-L2 spinal fusion 2023    Objective          Postural Observations    Additional Postural Observation Details  Good posture, flattened thoracic spine    Active Range of Motion   Cervical/Thoracic Spine   Cervical    Flexion: WFL and with  pain  Extension: WFL  Left rotation: WFL  Right rotation: WFL    Additional Active Range of Motion Details  No thoracic or lumbar mobility due to spinal fusion    Bilateral shoulder flexion AROM: 150 deg    Passive Range of Motion     Additional Passive Range of Motion Details  BLE ROM WNL    Strength/Myotome Testing     Left Shoulder     Planes of Motion   Flexion: 4   External rotation at 0°: 4   Internal rotation at 0°: 4     Isolated Muscles   Lower trapezius: 3   Middle trapezius: 3     Right Shoulder     Planes of Motion   Flexion: 4   External rotation at 0°: 4   Internal rotation at 0°: 4     Isolated Muscles   Lower trapezius: 3   Middle trapezius: 3     Left Elbow   Flexion: 4  Extension: 4    Right Elbow   Flexion: 4  Extension: 4    Left Hip   Planes of Motion   Flexion: 4-  Abduction: 4-    Right Hip   Planes of Motion   Flexion: 4-  Abduction: 4-    Left Knee   Flexion: 4  Extension: 4    Right Knee   Flexion: 4  Extension: 4    Left Ankle/Foot   Dorsiflexion: 4  Plantar flexion: 3    Right Ankle/Foot   Dorsiflexion: 4  Plantar flexion: 3    Muscle Activation   Patient unable to activate left transverse abdominals and right transverse abdominals.     Additional Muscle Activation Details  Lower abdominals: 2/5 strength    Lumbar Flexibility Comments:   Good hamstring flexibility, tight hip flexors          Assessment & Plan       Assessment  Impairments: abnormal muscle firing, abnormal or restricted ROM, activity intolerance, impaired physical strength, lacks appropriate home exercise program and pain with function   Functional limitations: carrying objects, lifting, sleeping, pulling, pushing, uncomfortable because of pain, moving in bed, sitting, standing, stooping, reaching overhead and unable to perform repetitive tasks   Assessment details: The patient is a 23 y.o. female who presents to physical therapy today for worsening mid and lower back pain over the last 6 months, history of T4-L2 fusion in  2024. Upon initial evaluation, the patient demonstrates the following impairments: abdominal and scapular weakness, BUE & BLE weakness, limited shoulder ROM, thoracic & lumbar hypomobility. Examination findings indicate that her postural/core weakness is likely the largest contributing factor to her pain that physical therapy can address.     Due to these impairments, the patient is unable to perform or has difficulty with the following functional tasks: sitting, bending forward, lifting anything heavy, sleeping exercising. The patient would benefit from skilled PT services to address functional limitations and impairments and to improve patient quality of life.      Prognosis: good    Goals  Plan Goals: ST. Pt will be independent and compliant with initial HEP in 4 weeks.  2. Pt will report a 25% improvement in symptoms since starting therapy in 4 weeks.  3. Pt will report pain level at worst <7 after sitting for 20 minutes in 4 weeks.  4. Pt will show improvement in body mechanics when lifting and sitting in 2 weeks in order to decrease strain on back.   5. Pt will improve bilateral shoulder flexion to >160 degrees and without pain in 4 weeks.    LT. Pt will be independent with final HEP for self-management of condition by DC.  2. Pt will improve score on Oswesty to less than 30% by DC.   3. Pt will report a 75% improvement in symptoms by DC in order to allow return to PLOF.  4. Pt will lower abdominal and scapular strength to 4 or 4+/5 in order to better stabilize her lumbar and thoracic spine in order to allow her to be more active and return to regular exercise program by DC.       Plan  Therapy options: will be seen for skilled therapy services  Planned modality interventions: cryotherapy, electrical stimulation/Russian stimulation, TENS, traction, thermotherapy (hydrocollator packs) and ultrasound  Planned therapy interventions: abdominal trunk stabilization, body mechanics training,  flexibility, functional ROM exercises, gait training, joint mobilization, home exercise program, manual therapy, neuromuscular re-education, postural training, soft tissue mobilization, spinal/joint mobilization, strengthening, stretching, therapeutic activities and motor coordination training  Frequency: 2x week  Duration in weeks: 12  Treatment plan discussed with: patient        See flowsheets for treatment detail.    History # of Personal Factors and/or Comorbidities: MODERATE (1-2)  Examination of Body System(s): # of elements: MODERATE (3)  Clinical Presentation: EVOLVING  Clinical Decision Making: MODERATE      Timed:         Manual Therapy:         mins  04899;     Therapeutic Exercise:    15     mins  05652;     Neuromuscular Jarrett:        mins  70945;    Therapeutic Activity:          mins  59806;     Gait Training:           mins  12152;     Ultrasound:          mins  99293;    Ionto                                   mins   18419  Self Care                            mins   24982      Un-Timed:  Electrical Stimulation:         mins  04333 ( );  Dry Needling          mins self-pay  Traction          mins 64332  Low Eval          Mins  86173  Mod Eval     30     Mins  70466  High Eval                            Mins  21458  Re-Eval                               mins  40761      Timed Treatment:   15   mins   Total Treatment:     45   mins    PT SIGNATURE: Tiffani Vargas PT   [place] PT license #: [id number]  [place] PT license #: [id number]  DATE TREATMENT INITIATED: 10/23/2024    Initial Certification  Certification Period: 1/20/2025  I certify that the therapy services are furnished while this patient is under my care.  The services outlined above are required by this patient, and will be reviewed every 90 days.    PHYSICIAN: Latoya Harris APRN  NPI: [id number]                                      DATE:     Please sign and return via fax to  [place] [address], [facility] [address]    [facility]  [place] [address]    Phone: [phone]    Fax: [phone] . Thank you, [facility].

## 2024-10-28 ENCOUNTER — TREATMENT (OUTPATIENT)
Dept: PHYSICAL THERAPY | Facility: CLINIC | Age: 24
End: 2024-10-28
Payer: COMMERCIAL

## 2024-10-28 DIAGNOSIS — M41.20 IDIOPATHIC SCOLIOSIS AND KYPHOSCOLIOSIS: Primary | ICD-10-CM

## 2024-10-28 DIAGNOSIS — G89.29 CHRONIC LOW BACK PAIN, UNSPECIFIED BACK PAIN LATERALITY, UNSPECIFIED WHETHER SCIATICA PRESENT: ICD-10-CM

## 2024-10-28 DIAGNOSIS — M54.50 CHRONIC LOW BACK PAIN, UNSPECIFIED BACK PAIN LATERALITY, UNSPECIFIED WHETHER SCIATICA PRESENT: ICD-10-CM

## 2024-10-28 PROCEDURE — 97110 THERAPEUTIC EXERCISES: CPT | Performed by: PHYSICAL THERAPIST

## 2024-10-28 PROCEDURE — 97112 NEUROMUSCULAR REEDUCATION: CPT | Performed by: PHYSICAL THERAPIST

## 2024-10-28 NOTE — PROGRESS NOTES
Physical Therapy Daily Treatment Note      [facility] [place]              [address], [address]                [place]  [address]        Patient: Casie Beard   : 2000  Diagnosis/ICD-10 Code:  Idiopathic scoliosis and kyphoscoliosis [M41.20]  Referring practitioner: NESHA Neil  Date of Initial Visit: Type: THERAPY  Noted: 10/23/2024  Today's Date: 10/28/2024  Patient seen for 2 sessions         Subjective    Casie Beard reports: her pain is worse today.  She thinks it is 5/10.  She said she overdid it this weekend.  She said it is mainly next to her (L) scapula.           Objective   See Exercise, Manual, and Modality Logs for complete treatment.     Tender (L) MT    Assessment/Plan  Reviewed exercises from initial eval as well as progress core strengthening.  She tolerated exercises well with cues for technique with main focus to avoid UT substitution with scapular strengthening as well as proper coordination of abdominal hold with LE motions.  Attempted STM to MT however pt reported pain without relief in area thus discontinued.  Trial of home estim unit while pt at work (in office next door) with relief reported when it was returned.  Will consult with pt next session and may assist to obtain for home use.  Will monitor tolerance to strengthening exercises and progress as able as well as update HEP when appropriate.       Progress per Plan of Care           Timed:  Manual Therapy:    2     mins  90698;  Therapeutic Exercise:    10   mins  33202;     Neuromuscular Jarrett:    15    mins  88622;    Therapeutic Activity:          mins  94352;     Gait Training:           mins  52746;     Ultrasound:          mins  85792;     Work Conditioning/Hardening (initial 2 hours)        mins  23459  Work Conditioning/Hardening (each add'l hour)        mins  68974    Untimed:   Electrical Stimulation:    5     mins  68932 ( );  Traction          mins 08388    Timed Treatment:   27    mins   Total Treatment:     32   mins    Amy Krause PTA  Physical Therapist Assistant

## 2024-10-30 ENCOUNTER — TREATMENT (OUTPATIENT)
Dept: PHYSICAL THERAPY | Facility: CLINIC | Age: 24
End: 2024-10-30
Payer: COMMERCIAL

## 2024-10-30 DIAGNOSIS — M41.20 IDIOPATHIC SCOLIOSIS AND KYPHOSCOLIOSIS: Primary | ICD-10-CM

## 2024-10-30 DIAGNOSIS — M54.50 CHRONIC LOW BACK PAIN, UNSPECIFIED BACK PAIN LATERALITY, UNSPECIFIED WHETHER SCIATICA PRESENT: ICD-10-CM

## 2024-10-30 DIAGNOSIS — G89.29 CHRONIC LOW BACK PAIN, UNSPECIFIED BACK PAIN LATERALITY, UNSPECIFIED WHETHER SCIATICA PRESENT: ICD-10-CM

## 2024-10-30 NOTE — PROGRESS NOTES
Physical Therapy Daily Treatment Note      [facility] [place]              [address], [address]                [place]  [address]        Patient: Casie Beard   : 2000  Diagnosis/ICD-10 Code:  Idiopathic scoliosis and kyphoscoliosis [M41.20]  Referring practitioner: NESHA Neil  Date of Initial Visit: Type: THERAPY  Noted: 10/23/2024  Today's Date: 10/30/2024  Patient seen for 3 sessions         Subjective    Casie Beard reports: her back is about the same.  She rated it 1/10 but it is not awake today. She was sore after last session.           Objective   See Exercise, Manual, and Modality Logs for complete treatment.       Assessment/Plan  Minimal progressions today to focus on form and proper activation with exercises as well as to improve tolerance to exercises as she was sore from last session that lingered into today.  She continued to require cues for technique with scapular retraction to avoid UT activation.  She was able to correct but difficulty maintaining which could be d/t numbness in MT.  She also added low trap activation with ball rolls to assist with proper technique with UE exercises.  Will monitor and continue to progress as able.  Estim for pain relief.     Progress per Plan of Care           Timed:  Manual Therapy:         mins  29456;  Therapeutic Exercise:    15     mins  52082;     Neuromuscular Jarrett:    10    mins  76114;    Therapeutic Activity:          mins  59585;     Gait Training:           mins  82812;     Ultrasound:          mins  76582;     Work Conditioning/Hardening (initial 2 hours)        mins  64351  Work Conditioning/Hardening (each add'l hour)        mins  20746    Untimed:   Electrical Stimulation:    5     mins  05257 ( );  Traction          mins 07387    Timed Treatment:   25   mins   Total Treatment:     30   mins    Amy Krause PTA  Physical Therapist Assistant   Problem: Urinary Incontinence  Goal: # Fewer urinary incontinence episodes per day  Select this goal if  goal is reduced incontinence (patient unable to control).  Outcome: Outcome Met, Continue evaluating goal progress toward completion  Patient incontinent of stool and urine. Condom cath changed last night. Intact overnight.     Problem: Delirium, Risk for  Goal: # No symptoms of delirium  Evaluate delirium symptoms under active problem when present  Outcome: Outcome Met, Continue evaluating goal progress toward completion  Patient mood variable. Patient can be irritable and agitated at times but still redirectable. Will continue to reorient patient to reality and include in cares as able.     Problem: Elimination-Bowel, Alteration  Goal: # Bowel function maintained/improved  Outcome: Outcome Met, Continue evaluating goal progress toward completion  Patient had multiple stools yesterday. Stool softeners not given last night (per caregiver judgement and patient request).

## 2024-11-04 ENCOUNTER — TREATMENT (OUTPATIENT)
Dept: PHYSICAL THERAPY | Facility: CLINIC | Age: 24
End: 2024-11-04
Payer: COMMERCIAL

## 2024-11-04 DIAGNOSIS — M41.20 IDIOPATHIC SCOLIOSIS AND KYPHOSCOLIOSIS: Primary | ICD-10-CM

## 2024-11-04 DIAGNOSIS — G89.29 CHRONIC LOW BACK PAIN, UNSPECIFIED BACK PAIN LATERALITY, UNSPECIFIED WHETHER SCIATICA PRESENT: ICD-10-CM

## 2024-11-04 DIAGNOSIS — M54.50 CHRONIC LOW BACK PAIN, UNSPECIFIED BACK PAIN LATERALITY, UNSPECIFIED WHETHER SCIATICA PRESENT: ICD-10-CM

## 2024-11-04 NOTE — PROGRESS NOTES
Physical Therapy Daily Treatment Note      [facility] [place]              [address], [address]                [place]  [address]        Patient: Casie Beard   : 2000  Diagnosis/ICD-10 Code:  Idiopathic scoliosis and kyphoscoliosis [M41.20]  Referring practitioner: NESHA Neil  Date of Initial Visit: Type: THERAPY  Noted: 10/23/2024  Today's Date: 2024  Patient seen for 4 sessions         Subjective    Casie Beard reports: she is hurting because she moved all weekend. She rated it 6-7/10.           Objective   See Exercise, Manual, and Modality Logs for complete treatment.       Assessment/Plan  Session limited today d/t increased pain from moving this weekend and work constraints.  Relief continued to be reported with mid thoracic stretch as well as with addition of swiss ball prayer stretch.  Maintained gentle ROM and strengthening today.  Finished with estim for pain relief.        Progress per Plan of Care           Timed:  Manual Therapy:         mins  53316;  Therapeutic Exercise:    24     mins  22610;     Neuromuscular Jarrett:        mins  33544;    Therapeutic Activity:          mins  44763;     Gait Training:           mins  22879;     Ultrasound:          mins  07614;     Work Conditioning/Hardening (initial 2 hours)        mins  32190  Work Conditioning/Hardening (each add'l hour)        mins  54686    Untimed:   Electrical Stimulation:    5     mins  18404 ( );  Traction          mins 02608    Timed Treatment:   24   mins   Total Treatment:     29   mins    Amy Krause PTA  Physical Therapist Assistant

## 2024-11-07 ENCOUNTER — TREATMENT (OUTPATIENT)
Dept: PHYSICAL THERAPY | Facility: CLINIC | Age: 24
End: 2024-11-07
Payer: COMMERCIAL

## 2024-11-07 DIAGNOSIS — M41.20 IDIOPATHIC SCOLIOSIS AND KYPHOSCOLIOSIS: Primary | ICD-10-CM

## 2024-11-07 DIAGNOSIS — G89.29 CHRONIC LOW BACK PAIN, UNSPECIFIED BACK PAIN LATERALITY, UNSPECIFIED WHETHER SCIATICA PRESENT: ICD-10-CM

## 2024-11-07 DIAGNOSIS — M54.50 CHRONIC LOW BACK PAIN, UNSPECIFIED BACK PAIN LATERALITY, UNSPECIFIED WHETHER SCIATICA PRESENT: ICD-10-CM

## 2024-11-07 NOTE — PROGRESS NOTES
Physical Therapy Daily Treatment Note      [facility] [place]              [address]                [place]  [address]        Patient: Casie Beard   : 2000  Diagnosis/ICD-10 Code:  Idiopathic scoliosis and kyphoscoliosis [M41.20]  Referring practitioner: NESHA Neil  Date of Initial Visit: Type: THERAPY  Noted: 10/23/2024  Today's Date: 2024  Patient seen for 5 sessions         Subjective    Casie Beard reports: it hurts around her shoulder blade on th e(L) when she takes a deep breath or moves a certain way.  She rated it a 4/10.            Objective   See Exercise, Manual, and Modality Logs for complete treatment.     Trigger point (L) MT/rhomboids    Education: use tennis ball against wall for self trigger point relief.    Assessment/Plan  Increased pain in (L) MT and rhomboids with exercises thus temporarily held and added STM. Improved tolerance to exercises following.  Also added gentle AROM to help with strengthening this area.  Reviewed all core exercises with continued fatigue reported during the exercises.  Estim for pain relief as well as add KT tape to trigger point.  Will monitor tolerance and progress exercises as able.      Progress per Plan of Care           Timed:  Manual Therapy:    8     mins  07151;  Therapeutic Exercise:    25     mins  13103;     Neuromuscular Jarrett:    3    mins  83022;    Therapeutic Activity:          mins  80853;     Gait Training:           mins  49214;     Ultrasound:          mins  57903;     Work Conditioning/Hardening (initial 2 hours)        mins  14100  Work Conditioning/Hardening (each add'l hour)        mins  31548    Untimed:   Electrical Stimulation:    5     mins  90860 ( );  Traction          mins 11216    Timed Treatment:   36   mins   Total Treatment:     41   mins    Amy Krause PTA  Physical Therapist Assistant

## 2024-11-11 ENCOUNTER — TREATMENT (OUTPATIENT)
Dept: PHYSICAL THERAPY | Facility: CLINIC | Age: 24
End: 2024-11-11
Payer: COMMERCIAL

## 2024-11-11 DIAGNOSIS — M41.20 IDIOPATHIC SCOLIOSIS AND KYPHOSCOLIOSIS: Primary | ICD-10-CM

## 2024-11-11 DIAGNOSIS — G89.29 CHRONIC LOW BACK PAIN, UNSPECIFIED BACK PAIN LATERALITY, UNSPECIFIED WHETHER SCIATICA PRESENT: ICD-10-CM

## 2024-11-11 DIAGNOSIS — M54.50 CHRONIC LOW BACK PAIN, UNSPECIFIED BACK PAIN LATERALITY, UNSPECIFIED WHETHER SCIATICA PRESENT: ICD-10-CM

## 2024-11-11 NOTE — PROGRESS NOTES
Physical Therapy Daily Treatment Note      [facility] [place]              [address]                [place]  [address]        Patient: Casie Beard   : 2000  Diagnosis/ICD-10 Code:  Idiopathic scoliosis and kyphoscoliosis [M41.20]  Referring practitioner: NESHA Neil  Date of Initial Visit: Type: THERAPY  Noted: 10/23/2024  Today's Date: 2024  Patient seen for 6 sessions         Subjective    Casie Beard reports: this weekend she had a bad pain on the (R) side and her mom rubbed it with some cream and felt a knot that was very painful.  She rated her pain a 5-6/10 and said hurts in the upper and lower back.            Objective   See Exercise, Manual, and Modality Logs for complete treatment.     (R) UT/MT/rhomboid tightness with trigger points, (L) MT/rhomboid tightness with trigger point    Assessment/Plan  Maintained exercises as pt reported with higher pain level than previous session.  Focus on gentle strengthening and stretching.  Also continued with manual to address soft tissue dysfunction and decrease pain.  (R) periscapular muscles tighter than (L) today.  Will monitor tolerance and progress as able.  Continued with estim for pain relief.     Progress per Plan of Care           Timed:  Manual Therapy:    8     mins  47250;  Therapeutic Exercise:    18     mins  69344;     Neuromuscular Jarrett:        mins  51899;    Therapeutic Activity:          mins  89451;     Gait Training:           mins  95308;     Ultrasound:          mins  00495;     Work Conditioning/Hardening (initial 2 hours)        mins  75023  Work Conditioning/Hardening (each add'l hour)        mins  66395    Untimed:   Electrical Stimulation:    5     mins  58180 ( );  Traction          mins 57294    Timed Treatment:   26   mins   Total Treatment:     31   mins    Amy Krause PTA  Physical Therapist Assistant

## 2024-11-13 ENCOUNTER — TREATMENT (OUTPATIENT)
Dept: PHYSICAL THERAPY | Facility: CLINIC | Age: 24
End: 2024-11-13
Payer: COMMERCIAL

## 2024-11-13 DIAGNOSIS — M41.20 IDIOPATHIC SCOLIOSIS AND KYPHOSCOLIOSIS: Primary | ICD-10-CM

## 2024-11-13 DIAGNOSIS — M54.50 CHRONIC LOW BACK PAIN, UNSPECIFIED BACK PAIN LATERALITY, UNSPECIFIED WHETHER SCIATICA PRESENT: ICD-10-CM

## 2024-11-13 DIAGNOSIS — G89.29 CHRONIC LOW BACK PAIN, UNSPECIFIED BACK PAIN LATERALITY, UNSPECIFIED WHETHER SCIATICA PRESENT: ICD-10-CM

## 2024-11-13 NOTE — PROGRESS NOTES
Physical Therapy Daily Treatment Note      [facility] [place]              [address]                [facility] [place]  [address]        Patient: Casie Beard   : 2000  Diagnosis/ICD-10 Code:  Idiopathic scoliosis and kyphoscoliosis [M41.20]  Referring practitioner: NESHA Neil  Date of Initial Visit: Type: THERAPY  Noted: 10/23/2024  Today's Date: 2024  Patient seen for 7 sessions         Subjective    Casie Beard reports: it hurts allover the upper back and rated it a 3/10.  She still gets a really sharp pain that feels like a spider is biting her but now it is on both sides.           Objective   See Exercise, Manual, and Modality Logs for complete treatment.     (L) UT hypertonic, (B) rhomboids/MT tightness with trigger points    Assessment/Plan  Pt reported a sharp pain in her back during PPT with hip ADD thus discontinued exercises and focused on pain relief.  Therapeutic rest break required following this.  Significant tightness noted on (L) UT with tenderness also present.  She also presented with trigger points in mid thoracic which were larger and more tender on the (R) compared to the (L).  STM to these areas however gentle pressure d/t tolerance.  May focus more on gentle stretching and soft tissue work over the next several sessions.  Assisted pt with electrode placement for home estim unit at end of session.    Progress per Plan of Care           Timed:  Manual Therapy:    10     mins  29656;  Therapeutic Exercise:    15     mins  61976;     Neuromuscular Jarrett:        mins  44911;    Therapeutic Activity:          mins  54066;     Gait Training:           mins  41065;     Ultrasound:          mins  50072;     Work Conditioning/Hardening (initial 2 hours)        mins  40288  Work Conditioning/Hardening (each add'l hour)        mins  76224    Untimed:   Electrical Stimulation:         mins  36792 (MC );  Traction          mins 91100    Timed Treatment:   25    mins   Total Treatment:     25   mins    Amy Krause PTA  Physical Therapist Assistant

## 2024-11-20 ENCOUNTER — TREATMENT (OUTPATIENT)
Dept: PHYSICAL THERAPY | Facility: CLINIC | Age: 24
End: 2024-11-20
Payer: COMMERCIAL

## 2024-11-20 DIAGNOSIS — G89.29 CHRONIC LOW BACK PAIN, UNSPECIFIED BACK PAIN LATERALITY, UNSPECIFIED WHETHER SCIATICA PRESENT: ICD-10-CM

## 2024-11-20 DIAGNOSIS — M41.20 IDIOPATHIC SCOLIOSIS AND KYPHOSCOLIOSIS: Primary | ICD-10-CM

## 2024-11-20 DIAGNOSIS — M54.50 CHRONIC LOW BACK PAIN, UNSPECIFIED BACK PAIN LATERALITY, UNSPECIFIED WHETHER SCIATICA PRESENT: ICD-10-CM

## 2024-11-27 ENCOUNTER — TREATMENT (OUTPATIENT)
Dept: PHYSICAL THERAPY | Facility: CLINIC | Age: 24
End: 2024-11-27
Payer: COMMERCIAL

## 2024-11-27 DIAGNOSIS — M54.50 CHRONIC LOW BACK PAIN, UNSPECIFIED BACK PAIN LATERALITY, UNSPECIFIED WHETHER SCIATICA PRESENT: ICD-10-CM

## 2024-11-27 DIAGNOSIS — G89.29 CHRONIC LOW BACK PAIN, UNSPECIFIED BACK PAIN LATERALITY, UNSPECIFIED WHETHER SCIATICA PRESENT: ICD-10-CM

## 2024-11-27 DIAGNOSIS — M41.20 IDIOPATHIC SCOLIOSIS AND KYPHOSCOLIOSIS: Primary | ICD-10-CM

## 2024-11-27 NOTE — PROGRESS NOTES
Re-Certification/Plan of Care        Patient: Casie Beard   : 2000  Diagnosis/ICD-10 Code:  Idiopathic scoliosis and kyphoscoliosis [M41.20]  Referring practitioner: NESHA Neil  Date of Initial Visit: Type: THERAPY  Noted: 10/23/2024  Today's Date: 2024  Patient seen for 9 sessions    VISIT#: 9    Subjective   Casie Beard reports: Not doing great, doesn't feel like her back is much better. Is doing the exercises every other day. At worst pain has been up to an 8/10.    Oswestry: 46%      Objective     Bilateral shoulder flexion AROM: 155 deg     Strength/Myotome Testing      Left Shoulder      Planes of Motion   Flexion: 4   External rotation at 0°: 4   Internal rotation at 0°: 4      Isolated Muscles   Lower trapezius: 3   Middle trapezius: 3      Right Shoulder      Planes of Motion   Flexion: 4   External rotation at 0°: 4   Internal rotation at 0°: 4      Isolated Muscles   Lower trapezius: 3   Middle trapezius: 3      Left Elbow   Flexion: 4  Extension: 4     Right Elbow   Flexion: 4  Extension: 4     Left Hip   Planes of Motion   Flexion: 4-  Abduction: 4-     Right Hip   Planes of Motion   Flexion: 4-  Abduction: 4-     Left Knee   Flexion: 4  Extension: 4     Right Knee   Flexion: 4  Extension: 4     Left Ankle/Foot   Dorsiflexion: 4  Plantar flexion: 3     Right Ankle/Foot   Dorsiflexion: 4  Plantar flexion: 3    Additional Muscle Activation Details  Lower abdominals: 2+/5 strength    See Exercise, Manual, and Modality Logs for complete treatment.     Patient Education: modified HEP to focus mostly on quadruped exercises.     Assessment & Plan       Assessment  Impairments: abnormal muscle firing, abnormal or restricted ROM, activity intolerance, impaired physical strength, lacks appropriate home exercise program and pain with function   Functional limitations: carrying objects, lifting, sleeping, pulling, pushing, uncomfortable because of pain, moving in bed, sitting,  standing, stooping, reaching overhead and unable to perform repetitive tasks   Assessment details: Casie has seen variable progress with therapy. She has started to respond well to the ultrasound, but has had a hard time tolerating the exercises or being able to do them regularly. Changed treatment approach today to focus on quadruped strengthening and lying on her back just tends to make her pain worse. Also I have added in dry needling as a possible treatment approach. She has met 2 STGs and making good progress toward remaining goals. Requires continued PT to address remaining deficits and return to PLOF.   Prognosis: good    Plan  Therapy options: will be seen for skilled therapy services  Planned modality interventions: cryotherapy, electrical stimulation/Russian stimulation, TENS, traction, thermotherapy (hydrocollator packs), ultrasound and dry needling  Planned therapy interventions: abdominal trunk stabilization, body mechanics training, flexibility, functional ROM exercises, gait training, joint mobilization, home exercise program, manual therapy, neuromuscular re-education, postural training, soft tissue mobilization, spinal/joint mobilization, strengthening, stretching, therapeutic activities and motor coordination training  Frequency: 2x week  Duration in weeks: 12  Treatment plan discussed with: patient         ST. Pt will be independent and compliant with initial HEP in 4 weeks. Met  2. Pt will report a 25% improvement in symptoms since starting therapy in 4 weeks. Not Met  3. Pt will report pain level at worst <7 after sitting for 20 minutes in 4 weeks. Not met  4. Pt will show improvement in body mechanics when lifting and sitting in 2 weeks in order to decrease strain on back. Met  5. Pt will improve bilateral shoulder flexion to >160 degrees and without pain in 4 weeks.     LT. Pt will be independent with final HEP for self-management of condition by JOESPH.  2. Pt will improve score on  Oswesty to less than 30% by DC.   3. Pt will report a 75% improvement in symptoms by DC in order to allow return to PLOF.  4. Pt will lower abdominal and scapular strength to 4 or 4+/5 in order to better stabilize her lumbar and thoracic spine in order to allow her to be more active and return to regular exercise program by DC.          Recommendations: Continue as planned  Certification Period: 2/24/2025  Prognosis to achieve goals: good    PT Signature: Tiffani Vargas, PT  [place] PT license #: [id number]  [place] PT license #: [id number]    Based upon review of the patient's progress and continued therapy plan, it is my medical opinion that Casie Beard should continue physical therapy treatment at [facility]  [address] [place] [address] [address]  [place] [address] [phone].    Signature: __________________________________  Latoya Harris, NESHA  Please sign and return via fax to  [address] [address] [address]  [place] [address] Thank you, [facility].    Timed:         Manual Therapy:         mins  87149;     Therapeutic Exercise:    25     mins  05587;     Neuromuscular Jarrett:        mins  67639;    Therapeutic Activity:      10    mins  58546;     Gait Training:           mins  34571;     Ultrasound:     12     mins  85872;    Ionto                                   mins   41365  Self Care                            mins   96617      Un-Timed:  Electrical Stimulation:         mins  66932 ( );  Dry Needling          mins self-pay  Traction          mins 37632  Low Eval          Mins  16130  Mod Eval          Mins  71732  High Eval                            Mins  21848  Re-Eval                               mins  29777      Timed Treatment:   47   mins   Total Treatment:     47   mins

## 2024-12-03 ENCOUNTER — TREATMENT (OUTPATIENT)
Dept: PHYSICAL THERAPY | Facility: CLINIC | Age: 24
End: 2024-12-03
Payer: COMMERCIAL

## 2024-12-03 DIAGNOSIS — M41.20 IDIOPATHIC SCOLIOSIS AND KYPHOSCOLIOSIS: Primary | ICD-10-CM

## 2024-12-03 DIAGNOSIS — M54.50 CHRONIC LOW BACK PAIN, UNSPECIFIED BACK PAIN LATERALITY, UNSPECIFIED WHETHER SCIATICA PRESENT: ICD-10-CM

## 2024-12-03 DIAGNOSIS — G89.29 CHRONIC LOW BACK PAIN, UNSPECIFIED BACK PAIN LATERALITY, UNSPECIFIED WHETHER SCIATICA PRESENT: ICD-10-CM

## 2024-12-03 PROCEDURE — 97035 APP MDLTY 1+ULTRASOUND EA 15: CPT | Performed by: PHYSICAL THERAPIST

## 2024-12-03 PROCEDURE — 97110 THERAPEUTIC EXERCISES: CPT | Performed by: PHYSICAL THERAPIST

## 2024-12-03 NOTE — PROGRESS NOTES
Physical Therapy Daily Treatment Note      [facility] [place]              [address]                [facility] [place]  [address]        Patient: Casie Beard   : 2000  Diagnosis/ICD-10 Code:  Idiopathic scoliosis and kyphoscoliosis [M41.20]  Referring practitioner: NESHA Neil  Date of Initial Visit: Type: THERAPY  Noted: 10/23/2024  Today's Date: 12/3/2024  Patient seen for 10 sessions         Subjective    Casie Beard reports: her back is ok.  She rated it /10.           Objective   See Exercise, Manual, and Modality Logs for complete treatment.     Tightness with trigger points in (B) mid trap and rhomboids    Assessment/Plan  Gentle progressions to exercises however limited overall to continue to monitor tolerance.  Plan to add standing exercises next session based on tolerance.  Continued with US for pain relief and to address muscle tightness in mid back.      Progress per Plan of Care           Timed:  Manual Therapy:         mins  87802;  Therapeutic Exercise:    8     mins  06247;     Neuromuscular Jarrett:   4     mins  79326;    Therapeutic Activity:          mins  36150;     Gait Training:           mins  10533;     Ultrasound:     12     mins  35570;     Work Conditioning/Hardening (initial 2 hours)        mins  18170  Work Conditioning/Hardening (each add'l hour)        mins  05626    Untimed:   Electrical Stimulation:         mins  15586 ( );  Traction          mins 67772    Timed Treatment:   24   mins   Total Treatment:     24   mins    Amy Krause PTA  Physical Therapist Assistant  [place] License# [id number]

## 2024-12-05 ENCOUNTER — TREATMENT (OUTPATIENT)
Dept: PHYSICAL THERAPY | Facility: CLINIC | Age: 24
End: 2024-12-05
Payer: COMMERCIAL

## 2024-12-05 DIAGNOSIS — M54.50 CHRONIC LOW BACK PAIN, UNSPECIFIED BACK PAIN LATERALITY, UNSPECIFIED WHETHER SCIATICA PRESENT: ICD-10-CM

## 2024-12-05 DIAGNOSIS — M41.20 IDIOPATHIC SCOLIOSIS AND KYPHOSCOLIOSIS: Primary | ICD-10-CM

## 2024-12-05 DIAGNOSIS — G89.29 CHRONIC LOW BACK PAIN, UNSPECIFIED BACK PAIN LATERALITY, UNSPECIFIED WHETHER SCIATICA PRESENT: ICD-10-CM

## 2024-12-05 NOTE — PROGRESS NOTES
Physical Therapy Daily Treatment Note    Patient: Casie Beard  : 2000  Referring practitioner: NESHA Neil  Today's Date: 2024    VISIT#: 11      Subjective   Casie Beard reports: Pt reports ~2/10 upon arrival. States prolonged sitting and sleeping wrong increases her symptoms. Pt states she wants to try dry needling intervention today for increased muscle flexibility and pain relief.      Objective     See Exercise, Manual, and Modality Logs for complete treatment.     Patient Education: Continue with HEP.    Assessment/Plan  Attempted dry needling today after consent form was signed. After insertion of 1 needle, patient opted to cease tx due to anxiety. Needle was taken out upon pt request.  Pt reports quadruped and standing exercises are much more tolerable and feels these will help more than previous supine exercises.     Progress per Plan of Care and Progress strengthening /stabilization /functional activity           Timed:         Manual Therapy:         mins  79137;     Therapeutic Exercise:    13     mins  00445;     Neuromuscular Jarrett:    32    mins  76753;    Therapeutic Activity:          mins  48599;     Gait Training:           mins  43211;     Ultrasound:     15     mins  19822;    Ionto                                   mins   23108  Self Care                            mins   37037      Un-Timed:  Electrical Stimulation:         mins  57066 ( );  Dry Needling          mins self-pay  Traction          mins 61743  Re-Eval                               mins  48224  Canalith Repos                   mins 69519    Timed Treatment:   60   mins   Total Treatment:     60   mins    Leticia Pena, PT  Physical Therapist   License: [id number]

## 2024-12-09 ENCOUNTER — TREATMENT (OUTPATIENT)
Dept: PHYSICAL THERAPY | Facility: CLINIC | Age: 24
End: 2024-12-09
Payer: COMMERCIAL

## 2024-12-09 DIAGNOSIS — M41.20 IDIOPATHIC SCOLIOSIS AND KYPHOSCOLIOSIS: Primary | ICD-10-CM

## 2024-12-09 DIAGNOSIS — G89.29 CHRONIC LOW BACK PAIN, UNSPECIFIED BACK PAIN LATERALITY, UNSPECIFIED WHETHER SCIATICA PRESENT: ICD-10-CM

## 2024-12-09 DIAGNOSIS — M54.50 CHRONIC LOW BACK PAIN, UNSPECIFIED BACK PAIN LATERALITY, UNSPECIFIED WHETHER SCIATICA PRESENT: ICD-10-CM

## 2024-12-09 PROCEDURE — 97035 APP MDLTY 1+ULTRASOUND EA 15: CPT

## 2024-12-09 PROCEDURE — 97112 NEUROMUSCULAR REEDUCATION: CPT

## 2024-12-09 PROCEDURE — 97110 THERAPEUTIC EXERCISES: CPT

## 2024-12-09 NOTE — PROGRESS NOTES
Physical Therapy Daily Treatment Note      [facility] [place]              [address]                [facility] [place]  [address]        Patient: Casie Beard   : 2000  Diagnosis/ICD-10 Code:  Idiopathic scoliosis and kyphoscoliosis [M41.20]  Referring practitioner: NESHA Neil  Date of Initial Visit: Type: THERAPY  Noted: 10/23/2024  Today's Date: 2024  Patient seen for 12 sessions         Subjective    Casie Beard reports: her pain is about 3/10 today.  She said the (L) shoulder blade is hurting.  She thinks the new exercises are better.  She doesn't get achy after like she was, not it feels worked/loosened after.           Objective   See Exercise, Manual, and Modality Logs for complete treatment.     Tightness with trigger point (B) MT/rhomboid    Assessment/Plan  Continued with core stabilization and scapular strengthening from previous sessions however was able to progress exercises as noted.  No increased pain or symtpoms with progressions made today however some fatigue reported. She reported tenderness in MT/rhomboids with US however also noted relief.     Progress per Plan of Care           Timed:  Manual Therapy:         mins  37532;  Therapeutic Exercise:    10     mins  48825;     Neuromuscular Jarrett:    10    mins  74368;    Therapeutic Activity:     5     mins  23661;     Gait Training:           mins  79644;     Ultrasound:     12     mins  58418;     Work Conditioning/Hardening (initial 2 hours)        mins  46118  Work Conditioning/Hardening (each add'l hour)        mins  63466    Untimed:   Electrical Stimulation:         mins  04622 (MC );  Traction          mins 41309    Timed Treatment:   37   mins   Total Treatment:     37   mins    Amy Krause PTA  Physical Therapist Assistant  [place] License# [id number]

## 2024-12-11 ENCOUNTER — TREATMENT (OUTPATIENT)
Dept: PHYSICAL THERAPY | Facility: CLINIC | Age: 24
End: 2024-12-11
Payer: COMMERCIAL

## 2024-12-11 DIAGNOSIS — M54.50 CHRONIC LOW BACK PAIN, UNSPECIFIED BACK PAIN LATERALITY, UNSPECIFIED WHETHER SCIATICA PRESENT: ICD-10-CM

## 2024-12-11 DIAGNOSIS — G89.29 CHRONIC LOW BACK PAIN, UNSPECIFIED BACK PAIN LATERALITY, UNSPECIFIED WHETHER SCIATICA PRESENT: ICD-10-CM

## 2024-12-11 DIAGNOSIS — M41.20 IDIOPATHIC SCOLIOSIS AND KYPHOSCOLIOSIS: Primary | ICD-10-CM

## 2024-12-11 NOTE — PROGRESS NOTES
Physical Therapy Daily Treatment Note    Patient: Casie Beard  : 2000  Referring practitioner: NESHA Neil  Today's Date: 2024    VISIT#: 13      Subjective   Casie Beard reports: Pt reports she is feeling good today.  States she was able to go to the gym last night and do sprints and walk on the treadmill with no pain.       Objective     See Exercise, Manual, and Modality Logs for complete treatment.     Patient Education: Continue with HEP.    Assessment/Plan  Presents with no pain during session today. Notes UE muscle soreness from going to the gym yesterday. Progressed trunk stability exercises. Cues provided to slow movement for improved control and muscle activation.    Progress per Plan of Care and Progress strengthening /stabilization /functional activity           Timed:         Manual Therapy:         mins  36490;     Therapeutic Exercise:    17     mins  75670;     Neuromuscular Jarrett:    27    mins  49762;    Therapeutic Activity:          mins  03046;     Gait Training:           mins  66276;     Ultrasound:          mins  62876;    Ionto                                   mins   42979  Self Care                            mins   22321      Un-Timed:  Electrical Stimulation:         mins  97457 ( );  Dry Needling          mins self-pay  Traction          mins 39068  Re-Eval                               mins  39815  Canalith Repos                   mins 36403    Timed Treatment:   44   mins   Total Treatment:     44   mins    Leticia Pena PT  Physical Therapist   License: [id number]

## 2024-12-16 ENCOUNTER — TREATMENT (OUTPATIENT)
Dept: PHYSICAL THERAPY | Facility: CLINIC | Age: 24
End: 2024-12-16
Payer: COMMERCIAL

## 2024-12-16 DIAGNOSIS — M41.20 IDIOPATHIC SCOLIOSIS AND KYPHOSCOLIOSIS: Primary | ICD-10-CM

## 2024-12-16 DIAGNOSIS — G89.29 CHRONIC LOW BACK PAIN, UNSPECIFIED BACK PAIN LATERALITY, UNSPECIFIED WHETHER SCIATICA PRESENT: ICD-10-CM

## 2024-12-16 DIAGNOSIS — M54.50 CHRONIC LOW BACK PAIN, UNSPECIFIED BACK PAIN LATERALITY, UNSPECIFIED WHETHER SCIATICA PRESENT: ICD-10-CM

## 2024-12-16 PROCEDURE — 97530 THERAPEUTIC ACTIVITIES: CPT

## 2024-12-16 PROCEDURE — 97035 APP MDLTY 1+ULTRASOUND EA 15: CPT

## 2024-12-16 NOTE — PROGRESS NOTES
Physical Therapy Daily Treatment Note      [facility] [place]              [address]                [place]  [address]        Patient: Casie Beard   : 2000  Diagnosis/ICD-10 Code:  Idiopathic scoliosis and kyphoscoliosis [M41.20]  Referring practitioner: NESHA Neil  Date of Initial Visit: Type: THERAPY  Noted: 10/23/2024  Today's Date: 2024  Patient seen for 14 sessions         Subjective    Casie Beard reports: she's a little sore after her nieces birthday party yesterday.           Objective   See Exercise, Manual, and Modality Logs for complete treatment.     Tightness with trigger point (L) mid trap, Tightness (R) low trap/lats    Assessment/Plan  Pt continued to have tightness as noted and continued to address with US.  Focused on standing exercises today for functional strengthening.  She tolerated gentle progressions well.  Popping reported with standing wall flexion however pt reported her back feeling looser at the end of the session.  Will monitor tolerance to progressions and continue to work on core and functional strengthening.     Progress per Plan of Care           Timed:  Manual Therapy:         mins  39257;  Therapeutic Exercise:         mins  25402;     Neuromuscular Jarrett:    3    mins  26226;    Therapeutic Activity:     10     mins  46974;     Gait Training:           mins  60208;     Ultrasound:     12     mins  11023;     Work Conditioning/Hardening (initial 2 hours)        mins  16190  Work Conditioning/Hardening (each add'l hour)        mins  92993    Untimed:   Electrical Stimulation:         mins  87319 ( );  Traction          mins 71463    Timed Treatment:   25   mins   Total Treatment:     25   mins    Amy Krause PTA  Physical Therapist Assistant  [place] License# [id number]

## 2024-12-23 ENCOUNTER — TREATMENT (OUTPATIENT)
Dept: PHYSICAL THERAPY | Facility: CLINIC | Age: 24
End: 2024-12-23
Payer: COMMERCIAL

## 2024-12-23 DIAGNOSIS — G89.29 CHRONIC LOW BACK PAIN, UNSPECIFIED BACK PAIN LATERALITY, UNSPECIFIED WHETHER SCIATICA PRESENT: ICD-10-CM

## 2024-12-23 DIAGNOSIS — M54.50 CHRONIC LOW BACK PAIN, UNSPECIFIED BACK PAIN LATERALITY, UNSPECIFIED WHETHER SCIATICA PRESENT: ICD-10-CM

## 2024-12-23 DIAGNOSIS — M41.20 IDIOPATHIC SCOLIOSIS AND KYPHOSCOLIOSIS: Primary | ICD-10-CM

## 2024-12-23 NOTE — PROGRESS NOTES
"Physical Therapy Daily Treatment Note    Patient: Casie Beard  : 2000  Referring practitioner: NESHA Neil  Today's Date: 2024    VISIT#: 15      Subjective   Casie Beard reports: Pt reports mild pain upon arrival, notes \"I don't know if it's from sleeping wrong or maybe the weather.\"       Objective     See Exercise, Manual, and Modality Logs for complete treatment.     Patient Education: Continue with HEP.    Assessment/Plan  Notes tenderness and pain along L inferior border of scapula. Improved with stretching and US treatment.      Progress per Plan of Care and Progress strengthening /stabilization /functional activity           Timed:         Manual Therapy:         mins  54781;     Therapeutic Exercise:    17     mins  62388;     Neuromuscular Jarrett:    8    mins  58867;    Therapeutic Activity:          mins  94767;     Gait Training:           mins  82431;     Ultrasound:     10     mins  99119;    Ionto                                   mins   47327  Self Care                            mins   66135      Un-Timed:  Electrical Stimulation:         mins  97459 (MC );  Dry Needling          mins self-pay  Traction          mins 56631  Re-Eval                               mins  25287  Canalith Repos                   mins 37526    Timed Treatment:   35   mins   Total Treatment:     35   mins    Leticia Pena, PT  Physical Therapist   License: [id number]    "

## 2024-12-26 ENCOUNTER — TREATMENT (OUTPATIENT)
Dept: PHYSICAL THERAPY | Facility: CLINIC | Age: 24
End: 2024-12-26
Payer: COMMERCIAL

## 2024-12-26 DIAGNOSIS — M54.50 CHRONIC LOW BACK PAIN, UNSPECIFIED BACK PAIN LATERALITY, UNSPECIFIED WHETHER SCIATICA PRESENT: ICD-10-CM

## 2024-12-26 DIAGNOSIS — G89.29 CHRONIC LOW BACK PAIN, UNSPECIFIED BACK PAIN LATERALITY, UNSPECIFIED WHETHER SCIATICA PRESENT: ICD-10-CM

## 2024-12-26 DIAGNOSIS — M41.20 IDIOPATHIC SCOLIOSIS AND KYPHOSCOLIOSIS: Primary | ICD-10-CM

## 2024-12-26 NOTE — PROGRESS NOTES
Physical Therapy Progress Note    Patient: Casie Beard  : 2000  Referring practitioner: NESHA Neil  Today's Date: 2024    VISIT#: 16    Oswestry: 21/50= 42% disability    Subjective Evaluation    Pain  Current pain ratin  At best pain ratin  At worst pain rating: 10       Casie Berad reports: Pt reports feeling PT intervention helps sometimes, notes her symptoms often depend on how well she sleeps. States sometimes she feels her surgery didn't heal right due to how much pain she's in. States when she lays on her back and fully relax, her pain increases and she feels like she can't breathe. Notes her next MD apt is in 2025.      Objective     Bilateral shoulder flexion AROM: 155 deg, increased mid-low back pain on L.  Strength/Myotome Testing      Left Shoulder      Planes of Motion   Flexion: 4   External rotation at 0°: 4- pain  Internal rotation at 0°: 4 pain     Right Shoulder      Planes of Motion   Flexion: 3+ limited by pain  External rotation at 0°: 4- pain  Internal rotation at 0°: 4 pain    Left Elbow   Flexion: 4 pain in back  Extension: 4     Right Elbow   Flexion: 4 pain in back  Extension: 4     Left Hip   Planes of Motion   Flexion: 4  Abduction: 4-     Right Hip   Planes of Motion   Flexion: 4+  Abduction: 4-     Left Knee   Flexion: 4  Extension: 4     Right Knee   Flexion: 4  Extension: 4     Left Ankle/Foot   Dorsiflexion: 4  Plantar flexion: 4-     Right Ankle/Foot   Dorsiflexion: 4  Plantar flexion: 4-     Additional Muscle Activation Details  Lower abdominals: 2+/5 strength    See Exercise, Manual, and Modality Logs for complete treatment.     Patient Education: Continue with HEP. Updated, printed and reviewed with pt at the end of session.    Assessment/Plan  Pt has made some progress toward her PT goals since IE. Presents today with increased tenderness and pain in her mid-lower back, likely from sleeping wrong, she states. Pt tolerating  quadruped and standing exercises better than supine exercises done previously. Reports relief with US intervention as well. She has met 5 STGs and making progress toward LTGs. Pt may benefit from continued skilled PT intervention to address remaining impairments and functional activity limitations.        ST. Pt will be independent and compliant with initial HEP in 4 weeks. Met  2. Pt will report a 25% improvement in symptoms since starting therapy in 4 weeks. Met - reports she has improved 25% since beginning PT.  3. Pt will report pain level at worst <7 after sitting for 20 minutes in 4 weeks. Met - Reports continued difficulty sitting in certain chairs, but her symptoms do not typically exceed 7/10.  4. Pt will show improvement in body mechanics when lifting and sitting in 2 weeks in order to decrease strain on back. Met  5. Pt will improve bilateral shoulder flexion to >160 degrees and without pain in 4 weeks. Partially met     LTG: progressing  1. Pt will be independent with final HEP for self-management of condition by DC. Not met  2. Pt will improve score on Oswesty to less than 30% by DC. Not met  3. Pt will report a 75% improvement in symptoms by DC in order to allow return to PLOF. Progressing  4. Pt will lower abdominal and scapular strength to 4 or 4+/5 in order to better stabilize her lumbar and thoracic spine in order to allow her to be more active and return to regular exercise program by DC. Not met      Progress per Plan of Care and Progress strengthening /stabilization /functional activity           Timed:         Manual Therapy:    10     mins  74578;     Therapeutic Exercise:    26     mins  58805;     Neuromuscular Jarrett:    20    mins  08347;    Therapeutic Activity:     5     mins  89854;     Gait Training:           mins  85360;     Ultrasound:          mins  03068;    Ionto                                   mins   65318  Self Care                            mins    88408      Un-Timed:  Electrical Stimulation:         mins  11807 ( );  Dry Needling          mins self-pay  Traction          mins 28107  Re-Eval                               mins  40132  Canalith Repos                   mins 81314    Timed Treatment:   61   mins   Total Treatment:     61   mins    Leticia Pena, PT  Physical Therapist   License: [id number]

## 2024-12-30 ENCOUNTER — TREATMENT (OUTPATIENT)
Dept: PHYSICAL THERAPY | Facility: CLINIC | Age: 24
End: 2024-12-30
Payer: COMMERCIAL

## 2024-12-30 DIAGNOSIS — G89.29 CHRONIC LOW BACK PAIN, UNSPECIFIED BACK PAIN LATERALITY, UNSPECIFIED WHETHER SCIATICA PRESENT: ICD-10-CM

## 2024-12-30 DIAGNOSIS — M41.20 IDIOPATHIC SCOLIOSIS AND KYPHOSCOLIOSIS: Primary | ICD-10-CM

## 2024-12-30 DIAGNOSIS — M54.50 CHRONIC LOW BACK PAIN, UNSPECIFIED BACK PAIN LATERALITY, UNSPECIFIED WHETHER SCIATICA PRESENT: ICD-10-CM

## 2024-12-30 PROCEDURE — 97112 NEUROMUSCULAR REEDUCATION: CPT

## 2024-12-30 PROCEDURE — 97110 THERAPEUTIC EXERCISES: CPT

## 2024-12-30 NOTE — PROGRESS NOTES
Physical Therapy Daily Treatment Note      [facility] [place] [place]              [address], [address]                [facility] [place]  [address]        Patient: Casie Beard   : 2000  Diagnosis/ICD-10 Code:  Idiopathic scoliosis and kyphoscoliosis [M41.20]  Referring practitioner: NESHA Neil  Date of Initial Visit: Type: THERAPY  Noted: 10/23/2024  Today's Date: 2024  Patient seen for 17 sessions         Subjective    Casie Beard reports: she has poison sumac, she thinks from a bonfire at her parents house.  Her back isn't hurting now.           Objective   See Exercise, Manual, and Modality Logs for complete treatment.       Assessment/Plan  Held manual and US d/t pt having a rash from poison sumac to avoid further spreading or irritation.  Focused on core strengthening exercises as noted.  Able to progress modified planks to more challenging positions.  Some increased mid back discomfort reported today.  Will monitor and progress as able including reintroduction of pain relief methods when rash subsides.     Progress per Plan of Care           Timed:  Manual Therapy:         mins  77405;  Therapeutic Exercise:    10     mins  09473;     Neuromuscular Jarrett:    15    mins  60398;    Therapeutic Activity:          mins  66236;     Gait Training:           mins  93451;     Ultrasound:          mins  76904;     Work Conditioning/Hardening (initial 2 hours)        mins  55231  Work Conditioning/Hardening (each add'l hour)        mins  16434    Untimed:   Electrical Stimulation:         mins  50616 (MC );  Traction          mins 68175    Timed Treatment:   25   mins   Total Treatment:     25   mins    Amy Krause PTA  Physical Therapist Assistant  [place] License# [id number]

## 2025-01-03 ENCOUNTER — TREATMENT (OUTPATIENT)
Dept: PHYSICAL THERAPY | Facility: CLINIC | Age: 25
End: 2025-01-03
Payer: COMMERCIAL

## 2025-01-03 DIAGNOSIS — M41.20 IDIOPATHIC SCOLIOSIS AND KYPHOSCOLIOSIS: Primary | ICD-10-CM

## 2025-01-03 DIAGNOSIS — M54.50 CHRONIC LOW BACK PAIN, UNSPECIFIED BACK PAIN LATERALITY, UNSPECIFIED WHETHER SCIATICA PRESENT: ICD-10-CM

## 2025-01-03 DIAGNOSIS — G89.29 CHRONIC LOW BACK PAIN, UNSPECIFIED BACK PAIN LATERALITY, UNSPECIFIED WHETHER SCIATICA PRESENT: ICD-10-CM

## 2025-01-03 NOTE — PROGRESS NOTES
Physical Therapy Daily Treatment Note      [facility] [place]              [address], [address]                [facility] [place]  [address]        Patient: Casie Beard   : 2000  Diagnosis/ICD-10 Code:  Idiopathic scoliosis and kyphoscoliosis [M41.20]  Referring practitioner: NESHA Neil  Date of Initial Visit: Type: THERAPY  Noted: 10/23/2024  Today's Date: 1/3/2025  Patient seen for 18 sessions         Subjective    Casie Beard reports: she isn't having much pain today and feels pretty good.  Her rash is better.           Objective   See Exercise, Manual, and Modality Logs for complete treatment.       Assessment/Plan  Held US today as pt reported no significant change in symptoms without use and low pain level at the start of the session.  Monitored throughout with some shoulder pain reported and therapeutic rest breaks issued.  Continued to focus on core strength with progressions as noted.  Will monitor tolerance and reintroduce US and/or manual as necessary.      Progress per Plan of Care           Timed:  Manual Therapy:         mins  65775;  Therapeutic Exercise:    10     mins  55225;     Neuromuscular Jarrett:    10    mins  02706;    Therapeutic Activity:     15     mins  22026;     Gait Training:           mins  85108;     Ultrasound:          mins  13861;     Work Conditioning/Hardening (initial 2 hours)        mins  74151  Work Conditioning/Hardening (each add'l hour)        mins  54606    Untimed:   Electrical Stimulation:         mins  80056 ( );  Traction          mins 46607    Timed Treatment:   35   mins   Total Treatment:     35   mins    Amy Krause PTA  Physical Therapist Assistant  [place] License# [id number]

## 2025-01-08 ENCOUNTER — TREATMENT (OUTPATIENT)
Dept: PHYSICAL THERAPY | Facility: CLINIC | Age: 25
End: 2025-01-08
Payer: COMMERCIAL

## 2025-01-08 DIAGNOSIS — G89.29 CHRONIC LOW BACK PAIN, UNSPECIFIED BACK PAIN LATERALITY, UNSPECIFIED WHETHER SCIATICA PRESENT: ICD-10-CM

## 2025-01-08 DIAGNOSIS — M54.50 CHRONIC LOW BACK PAIN, UNSPECIFIED BACK PAIN LATERALITY, UNSPECIFIED WHETHER SCIATICA PRESENT: ICD-10-CM

## 2025-01-08 DIAGNOSIS — M41.20 IDIOPATHIC SCOLIOSIS AND KYPHOSCOLIOSIS: Primary | ICD-10-CM

## 2025-01-08 PROCEDURE — 97035 APP MDLTY 1+ULTRASOUND EA 15: CPT

## 2025-01-08 PROCEDURE — 97112 NEUROMUSCULAR REEDUCATION: CPT

## 2025-01-08 PROCEDURE — 97110 THERAPEUTIC EXERCISES: CPT

## 2025-01-08 NOTE — PROGRESS NOTES
Physical Therapy Daily Treatment Note      [facility] [place] [facility] [place]              [address], [address]                [facility] [place]  [address]        Patient: Casie Beard   : 2000  Diagnosis/ICD-10 Code:  Idiopathic scoliosis and kyphoscoliosis [M41.20]  Referring practitioner: NESHA Neil  Date of Initial Visit: Type: THERAPY  Noted: 10/23/2024  Today's Date: 2025  Patient seen for 19 sessions         Subjective    Casie Beard reports: she is sore today.  She thinks the cold is bothering her.  She rated it 3-4/10 and it aches.  She said it is mainly between her shoulder blades.  She did shovel some snow a couple days ago.            Objective   See Exercise, Manual, and Modality Logs for complete treatment.     Tenderness MT (B)    Assessment/Plan  Reintroduced US to assist with pain relief d/t subjective.  Cautious with exercises as she reported increased soreness today.  Held several exercises to focus on tolerable strengthening and stretching.  Will reintroduce strengthening as able.    Progress per Plan of Care           Timed:  Manual Therapy:         mins  43937;  Therapeutic Exercise:    15     mins  76264;     Neuromuscular Jarrett:    8    mins  03552;    Therapeutic Activity:          mins  53664;     Gait Training:           mins  52918;     Ultrasound:     10     mins  82717;     Work Conditioning/Hardening (initial 2 hours)        mins  74681  Work Conditioning/Hardening (each add'l hour)        mins  33121    Untimed:   Electrical Stimulation:         mins  97543 (MC );  Traction          mins 09431    Timed Treatment:   33   mins   Total Treatment:     33   mins    Amy Krause PTA  Physical Therapist Assistant  [place] License# [id number]

## 2025-01-13 ENCOUNTER — TREATMENT (OUTPATIENT)
Dept: PHYSICAL THERAPY | Facility: CLINIC | Age: 25
End: 2025-01-13
Payer: COMMERCIAL

## 2025-01-13 DIAGNOSIS — M41.20 IDIOPATHIC SCOLIOSIS AND KYPHOSCOLIOSIS: Primary | ICD-10-CM

## 2025-01-13 DIAGNOSIS — G89.29 CHRONIC LOW BACK PAIN, UNSPECIFIED BACK PAIN LATERALITY, UNSPECIFIED WHETHER SCIATICA PRESENT: ICD-10-CM

## 2025-01-13 DIAGNOSIS — M54.50 CHRONIC LOW BACK PAIN, UNSPECIFIED BACK PAIN LATERALITY, UNSPECIFIED WHETHER SCIATICA PRESENT: ICD-10-CM

## 2025-01-13 PROCEDURE — 97530 THERAPEUTIC ACTIVITIES: CPT

## 2025-01-13 PROCEDURE — 97110 THERAPEUTIC EXERCISES: CPT

## 2025-01-13 PROCEDURE — 97035 APP MDLTY 1+ULTRASOUND EA 15: CPT

## 2025-01-13 NOTE — PROGRESS NOTES
Physical Therapy Daily Treatment Note      [facility] [place] [facility] [place]              [address], [address]                [facility] [place]  [address]        Patient: Casei Beard   : 2000  Diagnosis/ICD-10 Code:  Idiopathic scoliosis and kyphoscoliosis [M41.20]  Referring practitioner: NESHA Neil  Date of Initial Visit: Type: THERAPY  Noted: 10/23/2024  Today's Date: 2025  Patient seen for 20 sessions         Subjective    Casie Beard reports: she is sore between her shoulder blades today and rated it 3/10.  She went to the gym for 2hrs yesterday and is sore from that.            Objective   See Exercise, Manual, and Modality Logs for complete treatment.     Tenderness to (B) MT and (R) lats    Assessment/Plan  Continued with US to assist with pain relief.  Focused on scapular strengthening and functional core strengthening however overall exercises held as pt had to be to work at a certain time today.  Will continue to monitor and progress as able.     Progress per Plan of Care           Timed:  Manual Therapy:         mins  77130;  Therapeutic Exercise:    10     mins  38715;     Neuromuscular Jarrett:        mins  68536;    Therapeutic Activity:     8     mins  24848;     Gait Training:           mins  68246;     Ultrasound:     10     mins  78955;     Work Conditioning/Hardening (initial 2 hours)        mins  24299  Work Conditioning/Hardening (each add'l hour)        mins  52443    Untimed:   Electrical Stimulation:         mins  20599 ( );  Traction          mins 41455    Timed Treatment:   28   mins   Total Treatment:     28   mins    Amy Krause PTA  Physical Therapist Assistant  [place] License# [id number]

## 2025-01-15 ENCOUNTER — DOCUMENTATION (OUTPATIENT)
Dept: PHYSICAL THERAPY | Facility: CLINIC | Age: 25
End: 2025-01-15

## 2025-01-15 ENCOUNTER — TREATMENT (OUTPATIENT)
Dept: PHYSICAL THERAPY | Facility: CLINIC | Age: 25
End: 2025-01-15
Payer: COMMERCIAL

## 2025-01-15 DIAGNOSIS — M41.20 IDIOPATHIC SCOLIOSIS AND KYPHOSCOLIOSIS: Primary | ICD-10-CM

## 2025-01-15 DIAGNOSIS — M54.50 CHRONIC LOW BACK PAIN, UNSPECIFIED BACK PAIN LATERALITY, UNSPECIFIED WHETHER SCIATICA PRESENT: ICD-10-CM

## 2025-01-15 DIAGNOSIS — G89.29 CHRONIC LOW BACK PAIN, UNSPECIFIED BACK PAIN LATERALITY, UNSPECIFIED WHETHER SCIATICA PRESENT: ICD-10-CM

## 2025-01-15 NOTE — PROGRESS NOTES
Discharge Summary  Discharge Summary from Physical Therapy Report      Dates  PT visit: 10/23/25 - 1/15/25  Number of Visits: 21    Goals: Partially Met    Discharge Plan: Continue with current home exercise program as instructed    Comments Pt presents with mild improvement since IE. Agreeable to dc with HEP and continuation of going to the gym for further strengthening.    Date of Discharge 1/15/25        Leticia Pena, PT  Physical Therapist

## 2025-01-15 NOTE — PROGRESS NOTES
Physical Therapy Daily Treatment Note      [facility] [place]              [address], [address]                [facility] [place]  [address]        Patient: Casie Beard   : 2000  Diagnosis/ICD-10 Code:  Idiopathic scoliosis and kyphoscoliosis [M41.20]  Referring practitioner: NESHA Neil  Date of Initial Visit: Type: THERAPY  Noted: 10/23/2024  Today's Date: 1/15/2025  Patient seen for 21 sessions         Subjective    Casie Beard reports: just the normal pain of 1-2/10 and it doesn't really hurt until she moves a certain way.  30-35% better but thinks the mattress helped.     Highest:10/10  Best: 0/10    Oswestry=42%     Objective          Strength/Myotome Testing     Left Shoulder     Planes of Motion   Flexion: 4+   Abduction: 4+   External rotation at 0°: 4+   Internal rotation at 0°: 5     Isolated Muscles   Lower trapezius: 4   Middle trapezius: 3+     Right Shoulder     Planes of Motion   Flexion: 4+   Abduction: 5   External rotation at 0°: 4   Internal rotation at 0°: 4+     Isolated Muscles   Lower trapezius: 3+   Middle trapezius: 4-       Lower Abs: 2+/5; Able to perform PPT at lower levels with leg lowering test however not able to maintain throughout    See Exercise, Manual, and Modality Logs for complete treatment.       Assessment/Plan    Pt with improved shoulder strength however continued to have weakness in traps and lower abs.  MMT also caused pain in traps.  Pt opted to discharge for trial HEP as she can do the exercises and has returned to the gym.  Exercises limited today d/t increased pain following MMT.  Updated HEP and issued.     Discharged to [facility].           Timed:  Manual Therapy:         mins  28619;  Therapeutic Exercise:    10     mins  11598;     Neuromuscular Jarrett:    5    mins  98843;    Therapeutic Activity:     10     mins  49970;     Gait Training:           mins  03229;     Ultrasound:          mins  84854;     Work Conditioning/Hardening (initial 2  hours)        mins  57887  Work Conditioning/Hardening (each add'l hour)        mins  94067    Untimed:   Electrical Stimulation:         mins  70353 ( );  Traction          mins 89072    Timed Treatment:   25   mins   Total Treatment:     25   mins    Amy Krause PTA  Physical Therapist Assistant  [place] License# [id number]

## 2025-01-15 NOTE — PATIENT INSTRUCTIONS
Access Code: [email or web address]  URL: [email or web address]  Date: 01/15/2025  Prepared by: Amy Krause    Exercises  - Quadruped Transversus Abdominis Bracing  - 1 x daily - 1 sets - 10 reps  - Quadruped Scapular Protraction and Retraction  - 1 x daily - 1 sets - 10 reps  - Bird Dog  - 1 x daily - 7 x weekly - 1 sets - 15 reps  - Side Plank on Knees  - 1 x daily - 7 x weekly - 1 sets - 3 reps - 15 hold  - Plank on Knees  - 1 x daily - 7 x weekly - 1 sets - 3 reps - 15 hold  - Clamshell  - 1 x daily - 7 x weekly - 1 sets - 20 reps  - Shoulder External Rotation and Scapular Retraction with Resistance  - 1 x daily - 2 sets - 10-15 reps  - Seated Shoulder Horizontal Abduction with Resistance  - 1 x daily - 7 x weekly - 3 sets - 10 reps  - Standing Row with Anchored Resistance  - 1 x daily - 7 x weekly - 2 sets - 15 reps - 5 hold  - Shoulder extension with resistance - Neutral  - 1 x daily - 7 x weekly - 2 sets - 15 reps - 5 hold  - Standing Anti-Rotation Press with Anchored Resistance  - 1 x daily - 7 x weekly - 1 sets - 15 reps

## 2025-01-17 NOTE — PROGRESS NOTES
Chief Complaint  Chief Complaint   Patient presents with    Annual Exam        Subjective          Casie Beard is a 24-year-old female who reports to the office today for annual physical.    Annual physical-Denies any new family history. Family history:Mother- melanoma, uterine cancer, hysterectomy, salphingectomy, epilepsy, HTN. Father- HLD, HTN.  Grandmother- breast cancer. Social history: Denies smoking, drinks rarely, denies illegal drug use.      Iron defiency anemia-She is currently on iron, but has not been taking it.      Scoliosis- she had her back surgery February 2023- T4 to L2. Back pain currently 2/10. Describes as burning and achy, sometimes a stabbing. Cold weather and sleeping makes the pain worse. She reports she has been eating better and got a new mattress and this has helped.      Migraines-  She reports she takes Ubrelvy and it helps. She gets these migraines once a month.         Labs-Due  Papsmear-UTD       Vaccines:  HPV vaccine- She reports she has had this before, will obtain records     Dental exam-UTD   Eye exam-She denies any vision issues          Review of Systems   Constitutional:  Negative for chills and fever.   HENT:  Negative for congestion.    Eyes: Negative.    Respiratory:  Negative for shortness of breath.    Cardiovascular:  Negative for chest pain.   Gastrointestinal:  Negative for abdominal pain, nausea and vomiting.        Gas pain    Genitourinary:  Negative for difficulty urinating.   Musculoskeletal:  Negative for myalgias.   Skin: Negative.    Neurological:  Negative for dizziness, light-headedness and headache.   Psychiatric/Behavioral:  Negative for self-injury, suicidal ideas and depressed mood. The patient is nervous/anxious.         Objective   Vital Signs:   Vitals:    01/21/25 1105   BP: 118/74   Pulse: 81   Temp: 98.4 °F (36.9 °C)   SpO2: 100%      Estimated body mass index is 25.71 kg/m² as calculated from the following:    Height as of this encounter:  "154.9 cm (60.98\").    Weight as of this encounter: 61.7 kg (136 lb).                  Patient screened negative for depression based on a PHQ-9 score of 0  on 1/21/2025. Follow-up recommendations include: PCP managing depression.        Physical Exam  Vitals reviewed.   Constitutional:       Appearance: Normal appearance.   HENT:      Head: Normocephalic and atraumatic.      Nose: Nose normal.      Mouth/Throat:      Mouth: Mucous membranes are moist.      Pharynx: Oropharynx is clear.   Eyes:      Extraocular Movements: Extraocular movements intact.      Conjunctiva/sclera: Conjunctivae normal.      Pupils: Pupils are equal, round, and reactive to light.   Cardiovascular:      Rate and Rhythm: Normal rate and regular rhythm.      Pulses: Normal pulses.      Heart sounds: Normal heart sounds.      Comments: S1, S2 audible  Pulmonary:      Effort: Pulmonary effort is normal.      Breath sounds: Normal breath sounds.      Comments: On room air   Abdominal:      General: Abdomen is flat. Bowel sounds are normal.      Palpations: Abdomen is soft.   Musculoskeletal:         General: Normal range of motion.      Cervical back: Normal range of motion.   Skin:     General: Skin is warm and dry.   Neurological:      General: No focal deficit present.      Mental Status: She is alert and oriented to person, place, and time. Mental status is at baseline.   Psychiatric:         Mood and Affect: Mood normal.         Behavior: Behavior normal.         Thought Content: Thought content normal.         Judgment: Judgment normal.                Physical Exam   Result Review :             Procedures       Assessment and Plan     Diagnoses and all orders for this visit:    1. Iron deficiency anemia, unspecified iron deficiency anemia type (Primary)  Assessment & Plan:  Check CBC and iron  On ferrous sulfate    Iron defiency anemia-She is currently on iron, but has not been taking it.     Orders:  -     CBC & Differential; Future  -     " Iron; Future    2. Idiopathic scoliosis and kyphoscoliosis  Assessment & Plan:  Scoliosis- she had her back surgery February 2023- T4 to L2. Back pain currently 2/10. Describes as burning and achy, sometimes a stabbing. Cold weather and sleeping makes the pain worse. She reports she has been eating better and got a new mattress and this has helped.       3. Intractable migraine without aura and with status migrainosus  Assessment & Plan:        Migraines-  She reports she takes Ubrelvy and it helps. She gets these migraines once a month.         4. Encounter for annual physical exam  Assessment & Plan:  Annual physical-Denies any new family history. Family history:Mother- melanoma, uterine cancer, hysterectomy, salphingectomy, epilepsy, HTN. Father- HLD, HTN.  Grandmother- breast cancer. Social history: Denies smoking, drinks rarely, denies illegal drug use.        Labs-Due  Papsmear-UTD       Vaccines:  HPV vaccine- She reports she has had this before, will obtain records     Dental exam-UTD   Eye exam-She denies any vision issues     Encourage healthy diet and exercise  Encouraged monthly self breast exams  Check labs    Orders:  -     Lipid Panel; Future              Follow Up   Return in about 1 year (around 1/21/2026) for Annual physical.   Patient was given instructions and counseling regarding her condition or for health maintenance advice. Please see specific information pulled into the AVS if appropriate.

## 2025-01-20 NOTE — ASSESSMENT & PLAN NOTE
Check CBC and iron  On ferrous sulfate    Iron defiency anemia-She is currently on iron, but has not been taking it.

## 2025-01-20 NOTE — ASSESSMENT & PLAN NOTE
Annual physical-Denies any new family history. Family history:Mother- melanoma, uterine cancer, hysterectomy, salphingectomy, epilepsy, HTN. Father- HLD, HTN.  Grandmother- breast cancer. Social history: Denies smoking, drinks rarely, denies illegal drug use.        Labs-Due  Papsmear-UTD       Vaccines:  HPV vaccine- She reports she has had this before, will obtain records     Dental exam-UTD   Eye exam-She denies any vision issues     Encourage healthy diet and exercise  Encouraged monthly self breast exams  Check labs

## 2025-01-21 ENCOUNTER — OFFICE VISIT (OUTPATIENT)
Dept: FAMILY MEDICINE CLINIC | Facility: CLINIC | Age: 25
End: 2025-01-21
Payer: COMMERCIAL

## 2025-01-21 VITALS
DIASTOLIC BLOOD PRESSURE: 74 MMHG | TEMPERATURE: 98.4 F | WEIGHT: 136 LBS | HEIGHT: 61 IN | HEART RATE: 81 BPM | SYSTOLIC BLOOD PRESSURE: 118 MMHG | BODY MASS INDEX: 25.68 KG/M2 | OXYGEN SATURATION: 100 %

## 2025-01-21 DIAGNOSIS — D50.9 IRON DEFICIENCY ANEMIA, UNSPECIFIED IRON DEFICIENCY ANEMIA TYPE: Primary | ICD-10-CM

## 2025-01-21 DIAGNOSIS — Z00.00 ENCOUNTER FOR ANNUAL PHYSICAL EXAM: ICD-10-CM

## 2025-01-21 DIAGNOSIS — G43.011 INTRACTABLE MIGRAINE WITHOUT AURA AND WITH STATUS MIGRAINOSUS: ICD-10-CM

## 2025-01-21 DIAGNOSIS — M41.20 IDIOPATHIC SCOLIOSIS AND KYPHOSCOLIOSIS: ICD-10-CM

## 2025-01-21 PROBLEM — M79.605 LEFT LEG PAIN: Status: RESOLVED | Noted: 2024-01-17 | Resolved: 2025-01-21

## 2025-01-21 PROBLEM — R53.83 FATIGUE: Status: RESOLVED | Noted: 2024-04-16 | Resolved: 2025-01-21

## 2025-01-21 PROBLEM — Z80.3 FAMILY HISTORY OF BREAST CANCER: Status: ACTIVE | Noted: 2025-01-21

## 2025-01-21 PROCEDURE — 99395 PREV VISIT EST AGE 18-39: CPT | Performed by: NURSE PRACTITIONER

## 2025-01-21 RX ORDER — PREDNISONE 20 MG/1
TABLET ORAL
COMMUNITY
Start: 2024-12-29 | End: 2025-01-21

## 2025-01-21 NOTE — ASSESSMENT & PLAN NOTE
Scoliosis- she had her back surgery February 2023- T4 to L2. Back pain currently 2/10. Describes as burning and achy, sometimes a stabbing. Cold weather and sleeping makes the pain worse. She reports she has been eating better and got a new mattress and this has helped.

## 2025-01-21 NOTE — ASSESSMENT & PLAN NOTE
Migraines-  She reports she takes Ubrelvy and it helps. She gets these migraines once a month.      Right arm;

## 2025-01-22 ENCOUNTER — CLINICAL SUPPORT (OUTPATIENT)
Dept: FAMILY MEDICINE CLINIC | Facility: CLINIC | Age: 25
End: 2025-01-22
Payer: COMMERCIAL

## 2025-01-22 DIAGNOSIS — Z00.00 ENCOUNTER FOR ANNUAL PHYSICAL EXAM: ICD-10-CM

## 2025-01-22 DIAGNOSIS — D50.9 IRON DEFICIENCY ANEMIA, UNSPECIFIED IRON DEFICIENCY ANEMIA TYPE: ICD-10-CM

## 2025-01-22 PROCEDURE — 83540 ASSAY OF IRON: CPT | Performed by: NURSE PRACTITIONER

## 2025-01-22 PROCEDURE — 36415 COLL VENOUS BLD VENIPUNCTURE: CPT | Performed by: NURSE PRACTITIONER

## 2025-01-22 PROCEDURE — 85025 COMPLETE CBC W/AUTO DIFF WBC: CPT | Performed by: NURSE PRACTITIONER

## 2025-01-22 PROCEDURE — 80061 LIPID PANEL: CPT | Performed by: NURSE PRACTITIONER

## 2025-01-22 NOTE — PROGRESS NOTES
Venipuncture performed in L ARM by RT Eulalia  with good hemostasis. Patient tolerated well. 01/22/25 Mitali Morales, RT

## 2025-01-23 LAB
BASOPHILS # BLD AUTO: 0.04 10*3/MM3 (ref 0–0.2)
BASOPHILS NFR BLD AUTO: 0.7 % (ref 0–1.5)
CHOLEST SERPL-MCNC: 172 MG/DL (ref 0–200)
DEPRECATED RDW RBC AUTO: 38.3 FL (ref 37–54)
EOSINOPHIL # BLD AUTO: 0.15 10*3/MM3 (ref 0–0.4)
EOSINOPHIL NFR BLD AUTO: 2.6 % (ref 0.3–6.2)
ERYTHROCYTE [DISTWIDTH] IN BLOOD BY AUTOMATED COUNT: 11.9 % (ref 12.3–15.4)
HCT VFR BLD AUTO: 37.9 % (ref 34–46.6)
HDLC SERPL-MCNC: 42 MG/DL (ref 40–60)
HGB BLD-MCNC: 12.7 G/DL (ref 12–15.9)
IMM GRANULOCYTES # BLD AUTO: 0.01 10*3/MM3 (ref 0–0.05)
IMM GRANULOCYTES NFR BLD AUTO: 0.2 % (ref 0–0.5)
IRON 24H UR-MRATE: 103 MCG/DL (ref 37–145)
LDLC SERPL CALC-MCNC: 117 MG/DL (ref 0–100)
LDLC/HDLC SERPL: 2.78 {RATIO}
LYMPHOCYTES # BLD AUTO: 2.07 10*3/MM3 (ref 0.7–3.1)
LYMPHOCYTES NFR BLD AUTO: 35.4 % (ref 19.6–45.3)
MCH RBC QN AUTO: 29.3 PG (ref 26.6–33)
MCHC RBC AUTO-ENTMCNC: 33.5 G/DL (ref 31.5–35.7)
MCV RBC AUTO: 87.3 FL (ref 79–97)
MONOCYTES # BLD AUTO: 0.54 10*3/MM3 (ref 0.1–0.9)
MONOCYTES NFR BLD AUTO: 9.2 % (ref 5–12)
NEUTROPHILS NFR BLD AUTO: 3.03 10*3/MM3 (ref 1.7–7)
NEUTROPHILS NFR BLD AUTO: 51.9 % (ref 42.7–76)
NRBC BLD AUTO-RTO: 0 /100 WBC (ref 0–0.2)
PLATELET # BLD AUTO: 412 10*3/MM3 (ref 140–450)
PMV BLD AUTO: 9.1 FL (ref 6–12)
RBC # BLD AUTO: 4.34 10*6/MM3 (ref 3.77–5.28)
TRIGL SERPL-MCNC: 67 MG/DL (ref 0–150)
VLDLC SERPL-MCNC: 13 MG/DL (ref 5–40)
WBC NRBC COR # BLD AUTO: 5.84 10*3/MM3 (ref 3.4–10.8)

## 2025-03-20 ENCOUNTER — APPOINTMENT (OUTPATIENT)
Dept: ULTRASOUND IMAGING | Facility: HOSPITAL | Age: 25
End: 2025-03-20
Payer: COMMERCIAL

## 2025-03-20 ENCOUNTER — HOSPITAL ENCOUNTER (EMERGENCY)
Facility: HOSPITAL | Age: 25
Discharge: HOME | End: 2025-03-20
Attending: EMERGENCY MEDICINE
Payer: COMMERCIAL

## 2025-03-20 ENCOUNTER — APPOINTMENT (OUTPATIENT)
Dept: CT IMAGING | Facility: HOSPITAL | Age: 25
End: 2025-03-20
Payer: COMMERCIAL

## 2025-03-20 VITALS
DIASTOLIC BLOOD PRESSURE: 69 MMHG | HEIGHT: 61 IN | RESPIRATION RATE: 16 BRPM | HEART RATE: 82 BPM | BODY MASS INDEX: 25.47 KG/M2 | TEMPERATURE: 97.8 F | WEIGHT: 134.92 LBS | SYSTOLIC BLOOD PRESSURE: 107 MMHG | OXYGEN SATURATION: 97 %

## 2025-03-20 DIAGNOSIS — R10.10 UPPER ABDOMINAL PAIN: Primary | ICD-10-CM

## 2025-03-20 LAB
ALBUMIN SERPL-MCNC: 4.9 G/DL (ref 3.5–5.2)
ALBUMIN/GLOB SERPL: 1.5 G/DL
ALP SERPL-CCNC: 67 U/L (ref 39–117)
ALT SERPL W P-5'-P-CCNC: 11 U/L (ref 1–33)
ANION GAP SERPL CALCULATED.3IONS-SCNC: 12.2 MMOL/L (ref 5–15)
AST SERPL-CCNC: 17 U/L (ref 1–32)
B-HCG UR QL: NEGATIVE
BACTERIA UR QL AUTO: ABNORMAL /HPF
BASOPHILS # BLD AUTO: 0.05 10*3/MM3 (ref 0–0.2)
BASOPHILS NFR BLD AUTO: 0.6 % (ref 0–1.5)
BILIRUB SERPL-MCNC: 0.2 MG/DL (ref 0–1.2)
BILIRUB UR QL STRIP: NEGATIVE
BUN SERPL-MCNC: 12 MG/DL (ref 6–20)
BUN/CREAT SERPL: 16.2 (ref 7–25)
CALCIUM SPEC-SCNC: 9.4 MG/DL (ref 8.6–10.5)
CHLORIDE SERPL-SCNC: 104 MMOL/L (ref 98–107)
CLARITY UR: CLEAR
CO2 SERPL-SCNC: 24.8 MMOL/L (ref 22–29)
COLOR UR: YELLOW
CREAT SERPL-MCNC: 0.74 MG/DL (ref 0.57–1)
DEPRECATED RDW RBC AUTO: 38.8 FL (ref 37–54)
EGFRCR SERPLBLD CKD-EPI 2021: 116.8 ML/MIN/1.73
EOSINOPHIL # BLD AUTO: 0.1 10*3/MM3 (ref 0–0.4)
EOSINOPHIL NFR BLD AUTO: 1.2 % (ref 0.3–6.2)
ERYTHROCYTE [DISTWIDTH] IN BLOOD BY AUTOMATED COUNT: 12.1 % (ref 12.3–15.4)
GLOBULIN UR ELPH-MCNC: 3.2 GM/DL
GLUCOSE SERPL-MCNC: 93 MG/DL (ref 65–99)
GLUCOSE UR STRIP-MCNC: NEGATIVE MG/DL
HCT VFR BLD AUTO: 39.1 % (ref 34–46.6)
HGB BLD-MCNC: 12.7 G/DL (ref 12–15.9)
HGB UR QL STRIP.AUTO: ABNORMAL
HOLD SPECIMEN: NORMAL
HYALINE CASTS UR QL AUTO: ABNORMAL /LPF
IMM GRANULOCYTES # BLD AUTO: 0.02 10*3/MM3 (ref 0–0.05)
IMM GRANULOCYTES NFR BLD AUTO: 0.2 % (ref 0–0.5)
KETONES UR QL STRIP: NEGATIVE
LEUKOCYTE ESTERASE UR QL STRIP.AUTO: NEGATIVE
LIPASE SERPL-CCNC: 149 U/L (ref 13–60)
LYMPHOCYTES # BLD AUTO: 3.13 10*3/MM3 (ref 0.7–3.1)
LYMPHOCYTES NFR BLD AUTO: 38.8 % (ref 19.6–45.3)
MCH RBC QN AUTO: 28.6 PG (ref 26.6–33)
MCHC RBC AUTO-ENTMCNC: 32.5 G/DL (ref 31.5–35.7)
MCV RBC AUTO: 88.1 FL (ref 79–97)
MONOCYTES # BLD AUTO: 0.57 10*3/MM3 (ref 0.1–0.9)
MONOCYTES NFR BLD AUTO: 7.1 % (ref 5–12)
NEUTROPHILS NFR BLD AUTO: 4.2 10*3/MM3 (ref 1.7–7)
NEUTROPHILS NFR BLD AUTO: 52.1 % (ref 42.7–76)
NITRITE UR QL STRIP: NEGATIVE
NRBC BLD AUTO-RTO: 0 /100 WBC (ref 0–0.2)
PH UR STRIP.AUTO: 7.5 [PH] (ref 5–8)
PLATELET # BLD AUTO: 355 10*3/MM3 (ref 140–450)
PMV BLD AUTO: 8.5 FL (ref 6–12)
POTASSIUM SERPL-SCNC: 4 MMOL/L (ref 3.5–5.2)
PROT SERPL-MCNC: 8.1 G/DL (ref 6–8.5)
PROT UR QL STRIP: NEGATIVE
RBC # BLD AUTO: 4.44 10*6/MM3 (ref 3.77–5.28)
RBC # UR STRIP: ABNORMAL /HPF
REF LAB TEST METHOD: ABNORMAL
SODIUM SERPL-SCNC: 141 MMOL/L (ref 136–145)
SP GR UR STRIP: 1.01 (ref 1–1.03)
SQUAMOUS #/AREA URNS HPF: ABNORMAL /HPF
UROBILINOGEN UR QL STRIP: ABNORMAL
WBC # UR STRIP: ABNORMAL /HPF
WBC NRBC COR # BLD AUTO: 8.07 10*3/MM3 (ref 3.4–10.8)

## 2025-03-20 PROCEDURE — 99285 EMERGENCY DEPT VISIT HI MDM: CPT

## 2025-03-20 PROCEDURE — 80053 COMPREHEN METABOLIC PANEL: CPT | Performed by: EMERGENCY MEDICINE

## 2025-03-20 PROCEDURE — 76705 ECHO EXAM OF ABDOMEN: CPT

## 2025-03-20 PROCEDURE — 83690 ASSAY OF LIPASE: CPT | Performed by: EMERGENCY MEDICINE

## 2025-03-20 PROCEDURE — 96375 TX/PRO/DX INJ NEW DRUG ADDON: CPT

## 2025-03-20 PROCEDURE — 74177 CT ABD & PELVIS W/CONTRAST: CPT

## 2025-03-20 PROCEDURE — 85025 COMPLETE CBC W/AUTO DIFF WBC: CPT | Performed by: EMERGENCY MEDICINE

## 2025-03-20 PROCEDURE — 81025 URINE PREGNANCY TEST: CPT | Performed by: EMERGENCY MEDICINE

## 2025-03-20 PROCEDURE — 25510000001 IOPAMIDOL PER 1 ML: Performed by: EMERGENCY MEDICINE

## 2025-03-20 PROCEDURE — 81001 URINALYSIS AUTO W/SCOPE: CPT | Performed by: EMERGENCY MEDICINE

## 2025-03-20 PROCEDURE — 96374 THER/PROPH/DIAG INJ IV PUSH: CPT

## 2025-03-20 PROCEDURE — 25010000002 ONDANSETRON PER 1 MG: Performed by: EMERGENCY MEDICINE

## 2025-03-20 PROCEDURE — 25810000003 LACTATED RINGERS SOLUTION: Performed by: EMERGENCY MEDICINE

## 2025-03-20 RX ORDER — ONDANSETRON 4 MG/1
4 TABLET, ORALLY DISINTEGRATING ORAL EVERY 6 HOURS PRN
Qty: 12 TABLET | Refills: 0 | Status: SHIPPED | OUTPATIENT
Start: 2025-03-20

## 2025-03-20 RX ORDER — FAMOTIDINE 10 MG/ML
20 INJECTION, SOLUTION INTRAVENOUS ONCE
Status: COMPLETED | OUTPATIENT
Start: 2025-03-20 | End: 2025-03-20

## 2025-03-20 RX ORDER — DICYCLOMINE HCL 20 MG
20 TABLET ORAL EVERY 8 HOURS PRN
Qty: 15 TABLET | Refills: 0 | Status: SHIPPED | OUTPATIENT
Start: 2025-03-20

## 2025-03-20 RX ORDER — IOPAMIDOL 755 MG/ML
100 INJECTION, SOLUTION INTRAVASCULAR
Status: COMPLETED | OUTPATIENT
Start: 2025-03-20 | End: 2025-03-20

## 2025-03-20 RX ORDER — PANTOPRAZOLE SODIUM 40 MG/1
40 TABLET, DELAYED RELEASE ORAL DAILY
Qty: 30 TABLET | Refills: 0 | Status: SHIPPED | OUTPATIENT
Start: 2025-03-20

## 2025-03-20 RX ORDER — SODIUM CHLORIDE 0.9 % (FLUSH) 0.9 %
10 SYRINGE (ML) INJECTION AS NEEDED
Status: DISCONTINUED | OUTPATIENT
Start: 2025-03-20 | End: 2025-03-21 | Stop reason: HOSPADM

## 2025-03-20 RX ORDER — ONDANSETRON 2 MG/ML
4 INJECTION INTRAMUSCULAR; INTRAVENOUS ONCE
Status: COMPLETED | OUTPATIENT
Start: 2025-03-20 | End: 2025-03-20

## 2025-03-20 RX ADMIN — IOPAMIDOL 100 ML: 755 INJECTION, SOLUTION INTRAVENOUS at 20:56

## 2025-03-20 RX ADMIN — SODIUM CHLORIDE, SODIUM LACTATE, POTASSIUM CHLORIDE, CALCIUM CHLORIDE 1000 ML: 20; 30; 600; 310 INJECTION, SOLUTION INTRAVENOUS at 18:20

## 2025-03-20 RX ADMIN — FAMOTIDINE 20 MG: 10 INJECTION INTRAVENOUS at 18:19

## 2025-03-20 RX ADMIN — ONDANSETRON 4 MG: 2 INJECTION, SOLUTION INTRAMUSCULAR; INTRAVENOUS at 18:20

## 2025-03-21 ENCOUNTER — OFFICE VISIT (OUTPATIENT)
Dept: FAMILY MEDICINE CLINIC | Facility: CLINIC | Age: 25
End: 2025-03-21
Payer: COMMERCIAL

## 2025-03-21 VITALS
WEIGHT: 134 LBS | SYSTOLIC BLOOD PRESSURE: 117 MMHG | TEMPERATURE: 98 F | OXYGEN SATURATION: 100 % | HEART RATE: 83 BPM | HEIGHT: 61 IN | DIASTOLIC BLOOD PRESSURE: 78 MMHG | BODY MASS INDEX: 25.3 KG/M2

## 2025-03-21 DIAGNOSIS — R10.11 RIGHT UPPER QUADRANT ABDOMINAL PAIN: Primary | ICD-10-CM

## 2025-03-21 PROBLEM — R10.9 ABDOMINAL PAIN: Status: ACTIVE | Noted: 2025-03-21

## 2025-03-21 PROCEDURE — 99213 OFFICE O/P EST LOW 20 MIN: CPT | Performed by: NURSE PRACTITIONER

## 2025-03-21 NOTE — DISCHARGE INSTRUCTIONS
Protonix Bentyl Zofran sent to pharmacy.  Branch diet no greasy fried fatty spicy food caffeine alcohol anti-inflammatories such as Aleve or ibuprofen.  Follow-up as directed above.  Return for vomiting blood black or bloody stool fever uncontrolled pain dizziness passing out chest pain neck arm jaw pain or any other new or worsening problems or concerns return immediately ER

## 2025-03-21 NOTE — ED PROVIDER NOTES
Subjective   History of Present Illness    Review of Systems    No past medical history on file.    No Known Allergies    Past Surgical History:   Procedure Laterality Date    APPENDECTOMY  2020    BACK SURGERY  2023    T4-L2    DIAGNOSTIC LAPAROSCOPY  2022    WISDOM TOOTH EXTRACTION  2022       No family history on file.    Social History     Socioeconomic History    Marital status: Single   Tobacco Use    Smoking status: Never     Passive exposure: Never    Smokeless tobacco: Never   Vaping Use    Vaping status: Former    Start date: 1/1/2020    Quit date: 1/1/2023    Substances: Nicotine    Devices: Disposable   Substance and Sexual Activity    Alcohol use: Yes     Comment: socially    Drug use: Never    Sexual activity: Yes     Partners: Male           Objective   Physical Exam    Procedures           ED Course      Results for orders placed or performed during the hospital encounter of 03/20/25   Comprehensive Metabolic Panel    Collection Time: 03/20/25  6:17 PM    Specimen: Blood   Result Value Ref Range    Glucose 93 65 - 99 mg/dL    BUN 12 6 - 20 mg/dL    Creatinine 0.74 0.57 - 1.00 mg/dL    Sodium 141 136 - 145 mmol/L    Potassium 4.0 3.5 - 5.2 mmol/L    Chloride 104 98 - 107 mmol/L    CO2 24.8 22.0 - 29.0 mmol/L    Calcium 9.4 8.6 - 10.5 mg/dL    Total Protein 8.1 6.0 - 8.5 g/dL    Albumin 4.9 3.5 - 5.2 g/dL    ALT (SGPT) 11 1 - 33 U/L    AST (SGOT) 17 1 - 32 U/L    Alkaline Phosphatase 67 39 - 117 U/L    Total Bilirubin 0.2 0.0 - 1.2 mg/dL    Globulin 3.2 gm/dL    A/G Ratio 1.5 g/dL    BUN/Creatinine Ratio 16.2 7.0 - 25.0    Anion Gap 12.2 5.0 - 15.0 mmol/L    eGFR 116.8 >60.0 mL/min/1.73   Lipase    Collection Time: 03/20/25  6:17 PM    Specimen: Blood   Result Value Ref Range    Lipase 149 (H) 13 - 60 U/L   Urinalysis With Microscopic If Indicated (No Culture) - Urine, Clean Catch    Collection Time: 03/20/25  6:17 PM    Specimen: Urine, Clean Catch   Result Value Ref Range    Color, UA Yellow Yellow,  Straw    Appearance, UA Clear Clear    pH, UA 7.5 5.0 - 8.0    Specific Gravity, UA 1.010 1.005 - 1.030    Glucose, UA Negative Negative    Ketones, UA Negative Negative    Bilirubin, UA Negative Negative    Blood, UA Trace (A) Negative    Protein, UA Negative Negative    Leuk Esterase, UA Negative Negative    Nitrite, UA Negative Negative    Urobilinogen, UA 0.2 E.U./dL 0.2 - 1.0 E.U./dL   Pregnancy, Urine - Urine, Clean Catch    Collection Time: 03/20/25  6:17 PM    Specimen: Urine, Clean Catch   Result Value Ref Range    HCG, Urine QL Negative Negative   CBC Auto Differential    Collection Time: 03/20/25  6:17 PM    Specimen: Blood   Result Value Ref Range    WBC 8.07 3.40 - 10.80 10*3/mm3    RBC 4.44 3.77 - 5.28 10*6/mm3    Hemoglobin 12.7 12.0 - 15.9 g/dL    Hematocrit 39.1 34.0 - 46.6 %    MCV 88.1 79.0 - 97.0 fL    MCH 28.6 26.6 - 33.0 pg    MCHC 32.5 31.5 - 35.7 g/dL    RDW 12.1 (L) 12.3 - 15.4 %    RDW-SD 38.8 37.0 - 54.0 fl    MPV 8.5 6.0 - 12.0 fL    Platelets 355 140 - 450 10*3/mm3    Neutrophil % 52.1 42.7 - 76.0 %    Lymphocyte % 38.8 19.6 - 45.3 %    Monocyte % 7.1 5.0 - 12.0 %    Eosinophil % 1.2 0.3 - 6.2 %    Basophil % 0.6 0.0 - 1.5 %    Immature Grans % 0.2 0.0 - 0.5 %    Neutrophils, Absolute 4.20 1.70 - 7.00 10*3/mm3    Lymphocytes, Absolute 3.13 (H) 0.70 - 3.10 10*3/mm3    Monocytes, Absolute 0.57 0.10 - 0.90 10*3/mm3    Eosinophils, Absolute 0.10 0.00 - 0.40 10*3/mm3    Basophils, Absolute 0.05 0.00 - 0.20 10*3/mm3    Immature Grans, Absolute 0.02 0.00 - 0.05 10*3/mm3    nRBC 0.0 0.0 - 0.2 /100 WBC   Urinalysis, Microscopic Only - Urine, Clean Catch    Collection Time: 03/20/25  6:17 PM    Specimen: Urine, Clean Catch   Result Value Ref Range    RBC, UA 3-5 (A) None Seen, 0-2 /HPF    WBC, UA 3-5 (A) None Seen, 0-2 /HPF    Bacteria, UA Trace (A) None Seen /HPF    Squamous Epithelial Cells, UA 3-6 (A) None Seen, 0-2 /HPF    Hyaline Casts, UA None Seen None Seen /LPF    Methodology Automated  Microscopy    [facility] - [facility]    Collection Time: 03/20/25  6:17 PM   Result Value Ref Range    Extra Tube Hold for add-ons.      CT Abdomen Pelvis With Contrast  Result Date: 3/20/2025  No acute findings in the abdomen or pelvis. Electronically Signed: Sedrick Estes MD  3/20/2025 9:04 PM EDT  Workstation ID: [id number]    US Abdomen Limited  Result Date: 3/20/2025  Impression: Normal right upper quadrant ultrasound. Electronically Signed: Alayna Salazarley  3/20/2025 7:22 PM EDT  Workstation ID: [id number]    Medications   sodium chloride 0.9 % flush 10 mL (has no administration in time range)   morphine injection 4 mg (0 mg Intravenous Hold 3/20/25 2036)   lactated ringers bolus 1,000 mL (1,000 mL Intravenous New Bag 3/20/25 1820)   ondansetron (ZOFRAN) injection 4 mg (4 mg Intravenous Given 3/20/25 1820)   famotidine (PEPCID) injection 20 mg (20 mg Intravenous Given 3/20/25 1819)   iopamidol (ISOVUE-370) 76 % injection 100 mL (100 mL Intravenous Given 3/20/25 2056)                                                        Medical Decision Making  Problems Addressed:  Upper abdominal pain: complicated acute illness or injury    Amount and/or Complexity of Data Reviewed  Labs: ordered.  Radiology: ordered.    Risk  Prescription drug management.        Final diagnoses:   Upper abdominal pain       ED Disposition  ED Disposition       ED Disposition   Discharge    Condition   Stable    Comment   --               Latoya Harris, APRN  [address]  [address]  [place] [address]  [phone]    In 1 day      Sedrick Richard MD  [address] [facility] [place] [address]  [phone]    In 1 day           Medication List        New Prescriptions      dicyclomine 20 MG tablet  Commonly known as: BENTYL  Take 1 tablet by mouth Every 8 (Eight) Hours As Needed for Abdominal Cramping.     ondansetron ODT 4 MG disintegrating tablet  Commonly known as: ZOFRAN-ODT  Place 1 tablet on the tongue Every 6 (Six) Hours As Needed for  Nausea or Vomiting.     pantoprazole 40 MG EC tablet  Commonly known as: PROTONIX  Take 1 tablet by mouth Daily.            Stop      meloxicam 15 MG tablet  Commonly known as: Mobic               Where to Get Your Medications        These medications were sent to [facility][id number] - [facility] [place] [address] AT [facility] & [place] [address] - [phone]  - [phone] [address] [place] [address]      Phone: [phone]   dicyclomine 20 MG tablet  ondansetron ODT 4 MG disintegrating tablet  pantoprazole 40 MG EC tablet            Pantera Aguayo MD  03/21/25 0036     complete list of all possibilities but based on my history and physical clinical findings.  We talked about the multiple possibilities would not show up on scans and follow-up peptic ulcer disease and other type of bowel issues.  She was referred to GI and will be discharged home for outpatient management we talked about what to return for she voiced understanding stable unremarkable ER course.    Problems Addressed:  Upper abdominal pain: complicated acute illness or injury    Amount and/or Complexity of Data Reviewed  Labs: ordered. Decision-making details documented in ED Course.  Radiology: ordered and independent interpretation performed. Decision-making details documented in ED Course.    Risk  Prescription drug management.        Final diagnoses:   Upper abdominal pain       ED Disposition  ED Disposition       ED Disposition   Discharge    Condition   Stable    Comment   --               Latoya Harris, APRN  [address]  [address]  [place] [address]  [phone]    In 1 day      Sedrick Richard MD  [address] [facility] [place] [address]  [phone]    In 1 day           Medication List        New Prescriptions      dicyclomine 20 MG tablet  Commonly known as: BENTYL  Take 1 tablet by mouth Every 8 (Eight) Hours As Needed for Abdominal Cramping.     ondansetron ODT 4 MG disintegrating tablet  Commonly known as: ZOFRAN-ODT  Place 1 tablet on the tongue Every 6 (Six) Hours As Needed for Nausea or Vomiting.     pantoprazole 40 MG EC tablet  Commonly known as: PROTONIX  Take 1 tablet by mouth Daily.            Stop      meloxicam 15 MG tablet  Commonly known as: Mobic               Where to Get Your Medications        These medications were sent to [facility] #[id number] - [facility] [place] - [address] [place] [address] AT [facility] & [address] [phone]  - [phone] [address] [place] [address]      Phone: [phone]   dicyclomine 20 MG  tablet  ondansetron ODT 4 MG disintegrating tablet  pantoprazole 40 MG EC tablet            Pantera Aguayo MD  03/21/25 0036       Pantera Aguayo MD  03/27/25 1126

## 2025-03-21 NOTE — PROGRESS NOTES
"Chief Complaint  Chief Complaint   Patient presents with    Abdominal Pain     Upper abdominal pain- ER 3/20/25 Andi- increased lipase        Subjective          Casie Beard is a 24 year old female who presents to the office today for abdominal pain.     Abdominal pain- She went to the ER last night and had CT abd/pelvis, US, and labs. Her lipase came back elevated but did not show pancreatitis. She has been nauseated for 3-4 days. She reports she just ate and reports she feels sick. She reports the pain is in the upper middle abdomen. She reports this has happened before when her appendix as taken out. Current abdominal pain is 5-6/10. She describes it as cramping. She has had normal bowel movements. She denies fever. She still has a gallbladder.          Review of Systems   Constitutional:  Negative for chills and fever.   HENT:  Negative for congestion.    Respiratory:  Negative for shortness of breath.    Cardiovascular:  Negative for chest pain.   Gastrointestinal:  Positive for abdominal pain, nausea and vomiting. Negative for constipation and diarrhea.   Genitourinary:  Negative for difficulty urinating.   Musculoskeletal:  Negative for myalgias.   Skin: Negative.    Neurological:  Negative for dizziness, light-headedness and headache.        Objective   Vital Signs:   Vitals:    03/21/25 1244   BP: 117/78   Pulse: 83   Temp: 98 °F (36.7 °C)   SpO2: 100%      Estimated body mass index is 25.32 kg/m² as calculated from the following:    Height as of this encounter: 154.9 cm (61\").    Weight as of this encounter: 60.8 kg (134 lb).                          Physical Exam  Vitals reviewed.   Constitutional:       Appearance: Normal appearance. She is normal weight.   HENT:      Head: Normocephalic and atraumatic.      Nose: Nose normal.      Mouth/Throat:      Mouth: Mucous membranes are moist.      Pharynx: Oropharynx is clear.   Eyes:      Extraocular Movements: Extraocular movements intact.      " Conjunctiva/sclera: Conjunctivae normal.   Cardiovascular:      Rate and Rhythm: Normal rate and regular rhythm.      Pulses: Normal pulses.      Heart sounds: Normal heart sounds.      Comments: S1, S2 audible  Pulmonary:      Effort: Pulmonary effort is normal.      Breath sounds: Normal breath sounds.      Comments: On room air   Abdominal:      General: Abdomen is flat. Bowel sounds are normal.      Palpations: Abdomen is soft.      Tenderness: There is abdominal tenderness. There is guarding.      Comments: + Dover's sign    Musculoskeletal:         General: Normal range of motion.      Cervical back: Normal range of motion.   Skin:     General: Skin is warm and dry.   Neurological:      General: No focal deficit present.      Mental Status: She is alert and oriented to person, place, and time. Mental status is at baseline.   Psychiatric:         Mood and Affect: Mood normal.         Behavior: Behavior normal.         Thought Content: Thought content normal.         Judgment: Judgment normal.                Physical Exam   Result Review :             Procedures       Assessment and Plan     Diagnoses and all orders for this visit:    1. Right upper quadrant abdominal pain (Primary)  Assessment & Plan:  Abdominal pain- She went to the ER last night and had CT abd/pelvis, US, and labs. Her lipase came back elevated but did not show pancreatitis. She has been nauseated for 3-4 days. She reports she just ate and reports she feels sick. She reports the pain is in the upper middle abdomen. She reports this has happened before when her appendix as taken out. Current abdominal pain is 5-6/10. She describes it as cramping. She has had normal bowel movements. She denies fever. She still has a gallbladder.     Zofran 4mg ODT given in office     HIDA scan ordered  Encourage clear liquid/bland diet for now  Encourage follow-up with GI     Orders:  -     NM HIDA SCAN WITH PHARMACOLOGICAL INTERVENTION; Future               Follow Up   Return if symptoms worsen or fail to improve.   Patient was given instructions and counseling regarding her condition or for health maintenance advice. Please see specific information pulled into the AVS if appropriate.

## 2025-03-21 NOTE — ASSESSMENT & PLAN NOTE
Abdominal pain- She went to the ER last night and had CT abd/pelvis, US, and labs. Her lipase came back elevated but did not show pancreatitis. She has been nauseated for 3-4 days. She reports she just ate and reports she feels sick. She reports the pain is in the upper middle abdomen. She reports this has happened before when her appendix as taken out. Current abdominal pain is 5-6/10. She describes it as cramping. She has had normal bowel movements. She denies fever. She still has a gallbladder.     Zofran 4mg ODT given in office     HIDA scan ordered  Encourage clear liquid/bland diet for now  Encourage follow-up with GI

## 2025-03-31 ENCOUNTER — HOSPITAL ENCOUNTER (OUTPATIENT)
Dept: NUCLEAR MEDICINE | Facility: HOSPITAL | Age: 25
Discharge: HOME | End: 2025-03-31
Payer: COMMERCIAL

## 2025-03-31 DIAGNOSIS — R10.11 RIGHT UPPER QUADRANT ABDOMINAL PAIN: ICD-10-CM

## 2025-03-31 PROCEDURE — A9537 TC99M MEBROFENIN: HCPCS | Performed by: NURSE PRACTITIONER

## 2025-03-31 PROCEDURE — 78227 HEPATOBIL SYST IMAGE W/DRUG: CPT

## 2025-03-31 PROCEDURE — 34310000005 TECHNETIUM TC 99M MEBROFENIN KIT: Performed by: NURSE PRACTITIONER

## 2025-03-31 RX ORDER — KIT FOR THE PREPARATION OF TECHNETIUM TC 99M MEBROFENIN 45 MG/10ML
1 INJECTION, POWDER, LYOPHILIZED, FOR SOLUTION INTRAVENOUS
Status: COMPLETED | OUTPATIENT
Start: 2025-03-31 | End: 2025-03-31

## 2025-03-31 RX ADMIN — MEBROFENIN 1 DOSE: 45 INJECTION, POWDER, LYOPHILIZED, FOR SOLUTION INTRAVENOUS at 10:31

## 2025-04-01 RX ORDER — PANTOPRAZOLE SODIUM 40 MG/1
40 TABLET, DELAYED RELEASE ORAL DAILY
Qty: 30 TABLET | Refills: 0 | Status: SHIPPED | OUTPATIENT
Start: 2025-04-01

## 2025-04-11 ENCOUNTER — TRANSCRIBE ORDERS (OUTPATIENT)
Dept: ADMINISTRATIVE | Facility: HOSPITAL | Age: 25
End: 2025-04-11
Payer: COMMERCIAL

## 2025-04-11 DIAGNOSIS — M47.816 LUMBAR SPONDYLOSIS: Primary | ICD-10-CM

## 2025-04-18 ENCOUNTER — HOSPITAL ENCOUNTER (OUTPATIENT)
Dept: MRI IMAGING | Facility: HOSPITAL | Age: 25
Discharge: HOME | End: 2025-04-18
Admitting: PHYSICIAN ASSISTANT
Payer: COMMERCIAL

## 2025-04-18 DIAGNOSIS — M47.816 LUMBAR SPONDYLOSIS: ICD-10-CM

## 2025-04-18 PROCEDURE — 72148 MRI LUMBAR SPINE W/O DYE: CPT

## 2025-05-07 ENCOUNTER — TRANSCRIBE ORDERS (OUTPATIENT)
Dept: PHYSICAL THERAPY | Facility: CLINIC | Age: 25
End: 2025-05-07
Payer: COMMERCIAL

## 2025-05-07 ENCOUNTER — TREATMENT (OUTPATIENT)
Dept: PHYSICAL THERAPY | Facility: CLINIC | Age: 25
End: 2025-05-07
Payer: COMMERCIAL

## 2025-05-07 DIAGNOSIS — M54.6 CHRONIC MIDLINE THORACIC BACK PAIN: ICD-10-CM

## 2025-05-07 DIAGNOSIS — M54.2 PAIN, NECK: Primary | ICD-10-CM

## 2025-05-07 DIAGNOSIS — G89.29 CHRONIC MIDLINE THORACIC BACK PAIN: ICD-10-CM

## 2025-05-07 DIAGNOSIS — Z98.1 S/P SPINAL FUSION: ICD-10-CM

## 2025-05-07 DIAGNOSIS — M40.202 KYPHOSIS OF CERVICAL REGION, UNSPECIFIED KYPHOSIS TYPE: Primary | ICD-10-CM

## 2025-05-07 NOTE — LETTER
Physical Therapy Initial Evaluation and Plan of Care  [facility] [place]  [address]  [place] [address]  P: [phone] F: [phone]    Patient: Casie Beard   : 2000  Diagnosis/ICD-10 Code:  Pain, neck [M54.2]  Referring practitioner: NESHA Neil  Date of Initial Visit: 2025  Today's Date: 2025  Patient seen for 1 sessions         Visit Diagnoses:    ICD-10-CM ICD-9-CM   1. Pain, neck  M54.2 723.1   2. Chronic midline thoracic back pain  M54.6 724.1    G89.29 338.29         Subjective Questionnaire: NDI: = 24% disability      Subjective Evaluation    History of Present Illness  Mechanism of injury: Pt reports an onset of neck pain after her scoliosis surgery (Had a T4-L2 fusion in 2023.) Notes since surgery she has been unable to sleep with a pillow under her head. Continuing to have thoracic and lumbar pain as well. Intermittent shooting pain in her mid back.    Reports she has been having migraines and headaches since surgery ~1 year). MD feels it may be due to tension in her neck. Intermittent dizziness, nausea and vomiting associated with migraines.  Headache frequency: 4-5x/month - headache. ~1-2x/month - migraines. Takes meds and this helps. Notes this has been her frequency since surgery.    Pt denies Radicular symptoms or N/T.    States MRI showed inflamed SIJ. Given a prednisone pack. May be having injections in this region - not scheduled yet    25 - MRI L-spine - IMPRESSION:  Mild degenerative changes of the lumbar spine. Posterior fusion of the thoracolumbar spine. Hardware appears intact and appropriately situated.        Patient Occupation: works in occupational medicine Quality of life: good    Pain  Current pain ratin  At best pain ratin  At worst pain ratin  Location: neck pain.  Quality: stiffness, headaches.  Alleviating factors: popping it, lying down.  Aggravating factors:  keyboarding and lifting (looking down, sleeping with pillow)    Hand dominance: right    Diagnostic Tests  X-ray: abnormal (degeneration in her c/spine)  MRI studies: abnormal    Patient Goals  Patient goals for therapy: decreased edema, decreased pain, increased motion, increased strength and independence with ADLs/IADLs             Objective          Static Posture     Scapulae  Left winging, right winging, left protracted and right protracted.    Thoracic Spine  Flattened thoracic spine.    Palpation   Left   Tenderness of the cervical paraspinals, levator scapulae, middle trapezius, pectoralis minor, rhomboids, suboccipitals and upper trapezius.     Right Tenderness of the cervical paraspinals, levator scapulae, middle trapezius, pectoralis minor, rhomboids, suboccipitals and upper trapezius.     Neurological Testing     Sensation     Wrist/Hand   Left   Intact: light touch    Right   Intact: light touch    Active Range of Motion   Cervical/Thoracic Spine   Cervical    Flexion: 68 degrees with pain  Extension: 48 degrees   Left lateral flexion: 15 degrees with pain  Right lateral flexion: 27 degrees   Left rotation: 41 degrees   Right rotation: 41 degrees   Left Shoulder   Flexion: Left shoulder active forward flexion: Pain in L shoulder blade. WFL and with pain  External rotation BTH: WFL and with pain  Internal rotation BTB: WFL    Right Shoulder   Flexion: WFL  External rotation BTH: WFL  Internal rotation BTB: WFL    Strength/Myotome Testing     Left Shoulder     Planes of Motion   Flexion: 3+   Abduction: 4-     Right Shoulder     Planes of Motion   Flexion: 3+   Abduction: 4-     Left Elbow   Flexion: 4    Right Elbow   Flexion: 4  Extension: 4-    Left Wrist/Hand      (2nd hand position)     Trial 1: 55 lbs    Trial 2: 44 lbs    Trial 3: 48 lbs    Average: 49 lbs    Right Wrist/Hand      (2nd hand position)     Trial 1: 58 lbs    Trial 2: 54 lbs    Trial 3: 55 lbs    Average: 55.67  lbs    Additional Strength Details  Pain in her R>L shoulder with flexion and abduction. MMTs limited by pain.          Assessment & Plan       Assessment  Impairments: abnormal or restricted ROM, activity intolerance, impaired physical strength, lacks appropriate home exercise program and pain with function   Functional limitations: carrying objects, lifting and sleeping   Assessment details: The patient is a 24 y.o. female who presents to physical therapy today for neck and thoracic pain s/p spinal fusion in . Patient presents to therapy with neck pain and headaches, UE strength deficits, limited cervical lateral flexion, and tenderness to noted structures. Patient reports difficulty with sleeping and lifting heavy objects secondary to symptoms. Skilled Physical Therapy services needed to address listed impairments and improve upon ability to perform functional activities.  Prognosis: good    Goals  Plan Goals: ST weeks  1. Patient will be independent and compliant with initial HEP  2. Patient will report at least 50% improvement in neck pain and functional abilities since beginning PT intervention.  3. Pt will be able to sleep at least 6 hours without waking due to neck and back pain.  4. Pt will report decreased frequency and intensity of headaches to no greater than 3/10.    LT weeks  1. Patient will be independent with final HEP for self-management of condition by DC.  2. Patient will improve score on NDI to less than 16% by DC.   3. Patient will report at least 80% improvement in symptoms by DC in order to allow return to PLOF.  4.  Patient will improve BUE strength to 4+/5 and without neck pain in order to be able to perform heavy household activities such as cleaning and doing laundry.     Plan  Therapy options: will be seen for skilled therapy services  Planned modality interventions: cryotherapy, dry needling, TENS, electrical stimulation/Russian stimulation, thermotherapy (hydrocollator  packs), traction and ultrasound  Planned therapy interventions: manual therapy, motor coordination training, neuromuscular re-education, abdominal trunk stabilization, balance/weight-bearing training, body mechanics training, postural training, soft tissue mobilization, spinal/joint mobilization, flexibility, functional ROM exercises, strengthening, stretching, gait training, home exercise program, therapeutic activities, transfer training and joint mobilization  Frequency: 2x week  Duration in weeks: 12  Treatment plan discussed with: patient        See flowsheets for treatment detail.    History # of Personal Factors and/or Comorbidities: MODERATE (1-2)  Examination of Body System(s): # of elements: LOW (1-2)  Clinical Presentation: STABLE   Clinical Decision Making: MODERATE    Timed:         Manual Therapy:    10     mins  22548;     Therapeutic Exercise:    17     mins  26757;     Neuromuscular Jarrett:        mins  35925;    Therapeutic Activity:          mins  84521;     Gait Training:           mins  76925;     Ultrasound:          mins  13027;    Ionto                                   mins   96283  Self Care                            mins   39489      Un-Timed:  Electrical Stimulation:         mins  94041 (MC );  Dry Needling          mins self-pay  Traction          mins 30496  Low Eval          Mins  12713  Mod Eval     30     Mins  76283  High Eval                            Mins  33041  Re-Eval                               mins  46605  Canalith Repos                   mins 47786      Timed Treatment:   27   mins   Total Treatment:     57   mins    PT SIGNATURE: Leticia Pena PT , DPT  DATE TREATMENT INITIATED: 5/7/2025  License Number: [id number]      Initial Certification  Certification Period:  5/7/2025 thru 8/4/2025  I certify that the therapy services are furnished while this patient is under my care.  The services outlined above are required by this patient, and will be reviewed every 90  days.       Physician Signature:__________________________________________________    PHYSICIAN: Latoya Harris APRN  NPI: [id number]                                      DATE:    Please sign and return via fax to [phone].. Thank you, [facility].

## 2025-05-07 NOTE — PROGRESS NOTES
Physical Therapy Initial Evaluation and Plan of Care  [facility] [place]  [address], [address]  [place] [address]  P: [phone] F: [phone]    Patient: Casie Beard   : 2000  Diagnosis/ICD-10 Code:  Pain, neck [M54.2]  Referring practitioner: NESHA Neil  Date of Initial Visit: 2025  Today's Date: 2025  Patient seen for 1 sessions         Visit Diagnoses:    ICD-10-CM ICD-9-CM   1. Pain, neck  M54.2 723.1   2. Chronic midline thoracic back pain  M54.6 724.1    G89.29 338.29         Subjective Questionnaire: NDI: = 24% disability      Subjective Evaluation    History of Present Illness  Mechanism of injury: Pt reports an onset of neck pain after her scoliosis surgery (Had a T4-L2 fusion in 2023.) Notes since surgery she has been unable to sleep with a pillow under her head. Continuing to have thoracic and lumbar pain as well. Intermittent shooting pain in her mid back.    Reports she has been having migraines and headaches since surgery ~1 year). MD feels it may be due to tension in her neck. Intermittent dizziness, nausea and vomiting associated with migraines.  Headache frequency: 4-5x/month - headache. ~1-2x/month - migraines. Takes meds and this helps. Notes this has been her frequency since surgery.    Pt denies Radicular symptoms or N/T.    States MRI showed inflamed SIJ. Given a prednisone pack. May be having injections in this region - not scheduled yet    25 - MRI L-spine - IMPRESSION:  Mild degenerative changes of the lumbar spine. Posterior fusion of the thoracolumbar spine. Hardware appears intact and appropriately situated.        Patient Occupation: works in occupational medicine Quality of life: good    Pain  Current pain ratin  At best pain ratin  At worst pain ratin  Location: neck pain.  Quality: stiffness, headaches.  Alleviating factors: popping it, lying down.  Aggravating factors:  keyboarding and lifting (looking down, sleeping with pillow)    Hand dominance: right    Diagnostic Tests  X-ray: abnormal (degeneration in her c/spine)  MRI studies: abnormal    Patient Goals  Patient goals for therapy: decreased edema, decreased pain, increased motion, increased strength and independence with ADLs/IADLs             Objective          Static Posture     Scapulae  Left winging, right winging, left protracted and right protracted.    Thoracic Spine  Flattened thoracic spine.    Palpation   Left   Tenderness of the cervical paraspinals, levator scapulae, middle trapezius, pectoralis minor, rhomboids, suboccipitals and upper trapezius.     Right Tenderness of the cervical paraspinals, levator scapulae, middle trapezius, pectoralis minor, rhomboids, suboccipitals and upper trapezius.     Neurological Testing     Sensation     Wrist/Hand   Left   Intact: light touch    Right   Intact: light touch    Active Range of Motion   Cervical/Thoracic Spine   Cervical    Flexion: 68 degrees with pain  Extension: 48 degrees   Left lateral flexion: 15 degrees with pain  Right lateral flexion: 27 degrees   Left rotation: 41 degrees   Right rotation: 41 degrees   Left Shoulder   Flexion: Left shoulder active forward flexion: Pain in L shoulder blade. WFL and with pain  External rotation BTH: WFL and with pain  Internal rotation BTB: WFL    Right Shoulder   Flexion: WFL  External rotation BTH: WFL  Internal rotation BTB: WFL    Strength/Myotome Testing     Left Shoulder     Planes of Motion   Flexion: 3+   Abduction: 4-     Right Shoulder     Planes of Motion   Flexion: 3+   Abduction: 4-     Left Elbow   Flexion: 4    Right Elbow   Flexion: 4  Extension: 4-    Left Wrist/Hand      (2nd hand position)     Trial 1: 55 lbs    Trial 2: 44 lbs    Trial 3: 48 lbs    Average: 49 lbs    Right Wrist/Hand      (2nd hand position)     Trial 1: 58 lbs    Trial 2: 54 lbs    Trial 3: 55 lbs    Average: 55.67  lbs    Additional Strength Details  Pain in her R>L shoulder with flexion and abduction. MMTs limited by pain.          Assessment & Plan       Assessment  Impairments: abnormal or restricted ROM, activity intolerance, impaired physical strength, lacks appropriate home exercise program and pain with function   Functional limitations: carrying objects, lifting and sleeping   Assessment details: The patient is a 24 y.o. female who presents to physical therapy today for neck and thoracic pain s/p spinal fusion in . Patient presents to therapy with neck pain and headaches, UE strength deficits, limited cervical lateral flexion, and tenderness to noted structures. Patient reports difficulty with sleeping and lifting heavy objects secondary to symptoms. Skilled Physical Therapy services needed to address listed impairments and improve upon ability to perform functional activities.  Prognosis: good    Goals  Plan Goals: ST weeks  1. Patient will be independent and compliant with initial HEP  2. Patient will report at least 50% improvement in neck pain and functional abilities since beginning PT intervention.  3. Pt will be able to sleep at least 6 hours without waking due to neck and back pain.  4. Pt will report decreased frequency and intensity of headaches to no greater than 3/10.    LT weeks  1. Patient will be independent with final HEP for self-management of condition by DC.  2. Patient will improve score on NDI to less than 16% by DC.   3. Patient will report at least 80% improvement in symptoms by DC in order to allow return to PLOF.  4.  Patient will improve BUE strength to 4+/5 and without neck pain in order to be able to perform heavy household activities such as cleaning and doing laundry.     Plan  Therapy options: will be seen for skilled therapy services  Planned modality interventions: cryotherapy, dry needling, TENS, electrical stimulation/Russian stimulation, thermotherapy (hydrocollator  packs), traction and ultrasound  Planned therapy interventions: manual therapy, motor coordination training, neuromuscular re-education, abdominal trunk stabilization, balance/weight-bearing training, body mechanics training, postural training, soft tissue mobilization, spinal/joint mobilization, flexibility, functional ROM exercises, strengthening, stretching, gait training, home exercise program, therapeutic activities, transfer training and joint mobilization  Frequency: 2x week  Duration in weeks: 12  Treatment plan discussed with: patient        See flowsheets for treatment detail.    History # of Personal Factors and/or Comorbidities: MODERATE (1-2)  Examination of Body System(s): # of elements: LOW (1-2)  Clinical Presentation: STABLE   Clinical Decision Making: MODERATE    Timed:         Manual Therapy:    10     mins  26671;     Therapeutic Exercise:    17     mins  36277;     Neuromuscular Jarrett:        mins  99214;    Therapeutic Activity:          mins  93668;     Gait Training:           mins  53687;     Ultrasound:          mins  50941;    Ionto                                   mins   38335  Self Care                            mins   10110      Un-Timed:  Electrical Stimulation:         mins  50930 (MC );  Dry Needling          mins self-pay  Traction          mins 53633  Low Eval          Mins  63233  Mod Eval     30     Mins  39164  High Eval                            Mins  72642  Re-Eval                               mins  30869  Canalith Repos                   mins 81816      Timed Treatment:   27   mins   Total Treatment:     57   mins    PT SIGNATURE: Leticia Pena PT , DPT  DATE TREATMENT INITIATED: 5/7/2025  License Number: [id number]      Initial Certification  Certification Period:  5/7/2025 thru 8/4/2025  I certify that the therapy services are furnished while this patient is under my care.  The services outlined above are required by this patient, and will be reviewed every 90  days.       Physician Signature:__________________________________________________    PHYSICIAN: Latoya Harris APRN  NPI: [id number]                                      DATE:    Please sign and return via fax to [phone].. Thank you, [facility].

## 2025-05-07 NOTE — PATIENT INSTRUCTIONS
Access Code: [email or web address]  URL: [email or web address]  Date: 05/07/2025  Prepared by: Leticia Pena    Exercises  - Supine Cervical Retraction with Towel  - 2 x daily - 7 x weekly - 1 sets - 10 reps - 5 hold  - Standing Upper Trapezius Stretch  - 2 x daily - 7 x weekly - 1 sets - 4 reps - 15 hold  - Gentle Levator Scapulae Stretch  - 2 x daily - 7 x weekly - 1 sets - 15 reps - 15 hold  - Standing Backward Shoulder Rolls  - 2 x daily - 7 x weekly - 2 sets - 10 reps

## 2025-05-23 RX ORDER — PANTOPRAZOLE SODIUM 40 MG/1
40 TABLET, DELAYED RELEASE ORAL DAILY
Qty: 90 TABLET | Refills: 1 | Status: SHIPPED | OUTPATIENT
Start: 2025-05-23

## 2025-06-09 ENCOUNTER — DOCUMENTATION (OUTPATIENT)
Dept: PHYSICAL THERAPY | Facility: CLINIC | Age: 25
End: 2025-06-09

## 2025-06-09 NOTE — PROGRESS NOTES
Discharge Summary  Discharge Summary from Physical Therapy Report      Dates  PT visit: 5/7/25  Number of Visits:1    Discharge Status of Patient: See MD Note dated 4/21/25    Goals: Not Met    Discharge Plan: Continue with current home exercise program as instructed  Future need for rehabilitation activities  Patient to return to referring/providing physician    Comments Pt has not attended PT since IE as she has been unable to get off work. She's cancelled 8 apts and NCNS today.    Date of Discharge 6/9/25        Leticia Pena, PT, DPT  Physical Therapist